# Patient Record
Sex: FEMALE | Race: WHITE | NOT HISPANIC OR LATINO | Employment: OTHER | URBAN - METROPOLITAN AREA
[De-identification: names, ages, dates, MRNs, and addresses within clinical notes are randomized per-mention and may not be internally consistent; named-entity substitution may affect disease eponyms.]

---

## 2017-01-11 ENCOUNTER — OFFICE VISIT (OUTPATIENT)
Dept: INTERNAL MEDICINE | Facility: CLINIC | Age: 42
End: 2017-01-11
Payer: MEDICAID

## 2017-01-11 VITALS
HEART RATE: 104 BPM | WEIGHT: 159.19 LBS | OXYGEN SATURATION: 96 % | SYSTOLIC BLOOD PRESSURE: 116 MMHG | HEIGHT: 69 IN | TEMPERATURE: 98 F | BODY MASS INDEX: 23.58 KG/M2 | DIASTOLIC BLOOD PRESSURE: 90 MMHG

## 2017-01-11 DIAGNOSIS — K04.7 DENTAL ABSCESS: Primary | ICD-10-CM

## 2017-01-11 PROCEDURE — 99999 PR PBB SHADOW E&M-EST. PATIENT-LVL IV: CPT | Mod: PBBFAC,,, | Performed by: PHYSICIAN ASSISTANT

## 2017-01-11 PROCEDURE — 99212 OFFICE O/P EST SF 10 MIN: CPT | Mod: S$PBB,,, | Performed by: PHYSICIAN ASSISTANT

## 2017-01-11 PROCEDURE — 99214 OFFICE O/P EST MOD 30 MIN: CPT | Mod: PBBFAC | Performed by: PHYSICIAN ASSISTANT

## 2017-01-11 RX ORDER — CLINDAMYCIN HYDROCHLORIDE 300 MG/1
300 CAPSULE ORAL 3 TIMES DAILY
Qty: 30 CAPSULE | Refills: 0 | Status: SHIPPED | OUTPATIENT
Start: 2017-01-11 | End: 2017-01-21

## 2017-01-11 RX ORDER — HYDROCODONE BITARTRATE AND ACETAMINOPHEN 10; 325 MG/1; MG/1
1 TABLET ORAL EVERY 12 HOURS PRN
Qty: 20 TABLET | Refills: 0 | Status: SHIPPED | OUTPATIENT
Start: 2017-01-11 | End: 2017-01-16

## 2017-01-11 NOTE — PROGRESS NOTES
Subjective:       Patient ID: Mariah Boggs is a 41 y.o. female.    Chief Complaint: Dental Pain and Headache    Dental Pain    This is a new problem. The current episode started 1 to 4 weeks ago. The problem occurs constantly. The problem has been gradually worsening. The pain is at a severity of 8/10. The pain is moderate. Associated symptoms include facial pain, a fever and thermal sensitivity. Pertinent negatives include no difficulty swallowing, oral bleeding or sinus pressure. She has tried ice (Made an appointment to see a dentist, but said it would be 2 weeks out. ) for the symptoms. The treatment provided no relief.     Review of Systems   Constitutional: Positive for chills and fever. Negative for fatigue.   HENT: Negative for congestion, sinus pressure, sore throat and trouble swallowing.        Objective:      Physical Exam   Constitutional: She appears well-developed and well-nourished. No distress.   HENT:   Mouth/Throat: Dental abscesses present.       Neck: Neck supple.   Lymphadenopathy:     She has no cervical adenopathy.   Skin: She is not diaphoretic.   Nursing note and vitals reviewed.      Assessment:       1. Dental abscess        Plan:       Dental abscess    Other orders  -     clindamycin (CLEOCIN) 300 MG capsule; Take 1 capsule (300 mg total) by mouth 3 (three) times daily.  Dispense: 30 capsule; Refill: 0  -     hydrocodone-acetaminophen 10-325mg (NORCO)  mg Tab; Take 1 tablet by mouth every 12 (twelve) hours as needed (For dental abscess).  Dispense: 20 tablet; Refill: 0

## 2017-01-11 NOTE — MR AVS SNAPSHOT
[facility]  [address]  [place] [address]  Phone: [phone]  Fax: [phone]                  Mariah Boggs   2017 10:20 AM   Office Visit    Description:  Female : 1975   Provider:  Andrzej Quick III, PA-C   Department:  [facility]           Reason for Visit     Dental Pain     Headache                To Do List           Future Appointments        Provider Department Dept Phone    3/28/2017 7:30 AM LABORATORY, [facility] [phone]    3/28/2017 8:00 AM Joi Ruiz PA-C [facility] Rheumatology [phone]      Goals (5 Years of Data)     None       These Medications        Disp Refills Start End    clindamycin (CLEOCIN) 300 MG capsule 30 capsule 0 2017    Take 1 capsule (300 mg total) by mouth 3 (three) times daily. - Oral    [facility]  [place] [address] Ph #: [phone]       hydrocodone-acetaminophen 10-325mg (NORCO)  mg Tab 20 tablet 0 2017    Take 1 tablet by mouth every 12 (twelve) hours as needed (For dental abscess). - Oral    [facility]  [place] - [address] Ph #: [phone]         [facility]     [facility] Care Line - / Assistance  Registered nurses in the [facility] provide clinical advisement, health education, appointment booking, and other advisory services.  Call for this free service at [phone].             Medications           Message regarding Medications     Verify the changes and/or additions to your medication regime listed below are the same as discussed with your clinician today.  If any of these changes or additions are incorrect, please notify your healthcare provider.        START taking these NEW medications        Refills    clindamycin (CLEOCIN) 300 MG capsule 0    Sig: Take 1 capsule (300 mg total) by mouth  3 (three) times daily.    Class: Normal    Route: Oral    hydrocodone-acetaminophen 10-325mg (NORCO)  mg Tab 0    Sig: Take 1 tablet by mouth every 12 (twelve) hours as needed (For dental abscess).    Class: Print    Route: Oral      STOP taking these medications     predniSONE (DELTASONE) 20 MG tablet take 2 tablets by mouth once daily for 5 days    hydrocodone-acetaminophen (HYCET) solution 7.5-325 mg/15mL Take 10 mLs by mouth every 6 (six) hours as needed for Pain.           Verify that the below list of medications is an accurate representation of the medications you are currently taking.  If none reported, the list may be blank. If incorrect, please contact your healthcare provider. Carry this list with you in case of emergency.           Current Medications     acyclovir (ZOVIRAX) 200 MG capsule Take one capsule by mouth three times daily    albuterol (VENTOLIN HFA) 90 mcg/actuation inhaler Inhale two puffs by mouth every four hours as needed  for wheezing    albuterol-ipratropium 2.5mg-0.5mg/3mL (DUO-NEB) 0.5 mg-3 mg(2.5 mg base)/3 mL nebulizer solution INHALE ONE VIAL VIA NEBULIZER EVERY SIX HOURS AS NEEDED FOR WHEEZING.     diclofenac-misoprostol  mg-mcg (ARTHROTEC 50)  mg-mcg per tablet Take 1 tablet by mouth 2 (two) times daily.    estradiol (ESTRACE) 2 MG tablet Take 1 tablet (2 mg total) by mouth once daily.    famotidine (PEPCID) 40 MG tablet     fluticasone (FLONASE ALLERGY RELIEF) 50 mcg/actuation nasal spray 2 sprays by Each Nare route once daily.    gabapentin (NEURONTIN) 600 MG tablet TAKE ONE TABLET BY MOUTH FOUR TIMES DAILY    hydroxychloroquine (PLAQUENIL) 200 mg tablet Take 1 tablet (200 mg total) by mouth once daily.    lidocaine-prilocaine (EMLA) cream Apply topically as needed.    LINZESS 290 mcg Cap TAKE ONE CAPSULE BY MOUTH ONE TIME DAILY    lubiprostone (AMITIZA) 8 MCG Cap Take 1 capsule (8 mcg total) by mouth 2 (two) times daily.    omeprazole (PRILOSEC) 20 MG capsule  "Take 1 capsule (20 mg total) by mouth once daily.    tizanidine (ZANAFLEX) 4 MG tablet Take 1-2 tablets (4-8 mg total) by mouth 3 (three) times daily as needed.    clindamycin (CLEOCIN) 300 MG capsule Take 1 capsule (300 mg total) by mouth 3 (three) times daily.    diphenhydrAMINE-aluminum-magnesium hydroxide-simethicone-lidocaine HCl 2% Swish and spit 5 mLs every 4 (four) hours as needed.    hydrocodone-acetaminophen 10-325mg (NORCO)  mg Tab Take 1 tablet by mouth every 12 (twelve) hours as needed (For dental abscess).           Clinical Reference Information           Vital Signs - Last Recorded  Most recent update: 1/11/2017 10:37 AM by Pura Carbajal MA    BP Pulse Temp Ht    (!) 116/90 (BP Location: Right arm, Patient Position: Sitting, BP Method: Manual) 104 97.6 °F (36.4 °C) (Tympanic) 5' 9" (1.753 m)    Wt LMP SpO2 BMI    72.2 kg (159 lb 2.8 oz) (LMP Unknown) 96% 23.51 kg/m2      Blood Pressure          Most Recent Value    BP  (!)  116/90      Allergies as of 1/11/2017     Metoclopramide    Phenergan [Promethazine]    Cortisone    Penicillins      Immunizations Administered on Date of Encounter - 1/11/2017     None      Instructions      Continue with antibiotics and new medicines until gone. May take tylenol PRN fever. Increase fluids and rest. Follow up with your dentist ASAP. Suggest togo to the Emergency Room if symptoms get much worse. Otherwise follow up with your PCP as scheduled.        Smoking Cessation     If you would like to quit smoking:   You may be eligible for free services if you are a [place] resident and started smoking cigarettes before September 1, 1988.  Call the Smoking Cessation Trust ([employer]) toll free at [phone] or [phone].   Call 4-800-QUIT-NOW if you do not meet the above criteria.            "

## 2017-01-11 NOTE — PATIENT INSTRUCTIONS
Continue with antibiotics and new medicines until gone. May take tylenol PRN fever. Increase fluids and rest. Follow up with your dentist ASAP. Suggest togo to the Emergency Room if symptoms get much worse. Otherwise follow up with your PCP as scheduled.

## 2017-02-14 ENCOUNTER — OFFICE VISIT (OUTPATIENT)
Dept: INTERNAL MEDICINE | Facility: CLINIC | Age: 42
End: 2017-02-14
Payer: MEDICAID

## 2017-02-14 VITALS
TEMPERATURE: 97 F | OXYGEN SATURATION: 98 % | DIASTOLIC BLOOD PRESSURE: 82 MMHG | HEART RATE: 80 BPM | HEIGHT: 69 IN | WEIGHT: 163.38 LBS | BODY MASS INDEX: 24.2 KG/M2 | SYSTOLIC BLOOD PRESSURE: 120 MMHG

## 2017-02-14 DIAGNOSIS — F33.1 MODERATE EPISODE OF RECURRENT MAJOR DEPRESSIVE DISORDER: ICD-10-CM

## 2017-02-14 DIAGNOSIS — F17.200 TOBACCO DEPENDENCE SYNDROME: ICD-10-CM

## 2017-02-14 DIAGNOSIS — K04.7 DENTAL ABSCESS: Primary | ICD-10-CM

## 2017-02-14 PROCEDURE — 99213 OFFICE O/P EST LOW 20 MIN: CPT | Mod: S$PBB,,, | Performed by: PHYSICIAN ASSISTANT

## 2017-02-14 PROCEDURE — 99215 OFFICE O/P EST HI 40 MIN: CPT | Mod: PBBFAC | Performed by: PHYSICIAN ASSISTANT

## 2017-02-14 PROCEDURE — 99999 PR PBB SHADOW E&M-EST. PATIENT-LVL V: CPT | Mod: PBBFAC,,, | Performed by: PHYSICIAN ASSISTANT

## 2017-02-14 RX ORDER — HYDROCODONE BITARTRATE AND ACETAMINOPHEN 7.5; 325 MG/1; MG/1
1 TABLET ORAL EVERY 6 HOURS PRN
Qty: 20 TABLET | Refills: 0 | Status: SHIPPED | OUTPATIENT
Start: 2017-02-14 | End: 2017-05-04

## 2017-02-14 RX ORDER — ESCITALOPRAM OXALATE 10 MG/1
10 TABLET ORAL DAILY
Qty: 30 TABLET | Refills: 5 | Status: SHIPPED | OUTPATIENT
Start: 2017-02-14 | End: 2017-07-21 | Stop reason: ALTCHOICE

## 2017-02-14 RX ORDER — CLINDAMYCIN HYDROCHLORIDE 300 MG/1
300 CAPSULE ORAL 3 TIMES DAILY
Qty: 30 CAPSULE | Refills: 0 | Status: SHIPPED | OUTPATIENT
Start: 2017-02-14 | End: 2017-02-24

## 2017-02-14 NOTE — PROGRESS NOTES
Subjective:       Patient ID: Mariah Boggs is a 41 y.o. female.    Chief Complaint: Dental Pain    Dental Pain    This is a new problem. The current episode started in the past 7 days. The problem occurs constantly. The problem has been unchanged. The pain is moderate. Associated symptoms include facial pain and thermal sensitivity. She has tried nothing (She has a dental appointment in 2 weeks. ) for the symptoms.   Mental Health Problem   The primary symptoms include dysphoric mood. The current episode started more than 1 month ago.   Precipitated by: chronic illness. The degree of incapacity that she is experiencing as a consequence of her illness is moderate. Additional symptoms of the illness include fatigue. Additional symptoms of the illness do not include no abdominal pain. She does not admit to suicidal ideas.     Review of Systems   Constitutional: Positive for fatigue. Negative for chills.   HENT: Negative.  Negative for facial swelling and mouth sores.    Respiratory: Negative for chest tightness and shortness of breath.    Cardiovascular: Negative for chest pain.   Gastrointestinal: Negative for abdominal pain.   Psychiatric/Behavioral: Positive for dysphoric mood. Negative for self-injury and suicidal ideas.       Objective:      Physical Exam   Constitutional: She appears well-developed and well-nourished. No distress.   HENT:   Head: Normocephalic and atraumatic.   Mouth/Throat: Oropharynx is clear and moist. Dental abscesses and dental caries present.       Cardiovascular: Normal rate and regular rhythm.  Exam reveals no gallop and no friction rub.    No murmur heard.  Pulmonary/Chest: Effort normal and breath sounds normal. No respiratory distress. She has no wheezes. She has no rales. She exhibits no tenderness.   Abdominal: Soft. There is no tenderness.   Lymphadenopathy:     She has no cervical adenopathy.   Skin: She is not diaphoretic.   Nursing note and vitals reviewed.      Assessment:        1. Dental abscess    2. Tobacco dependence syndrome    3. Moderate episode of recurrent major depressive disorder        Plan:       Dental abscess    Tobacco dependence syndrome  -     Ambulatory referral to Smoking Cessation Program    Moderate episode of recurrent major depressive disorder    Other orders  -     escitalopram oxalate (LEXAPRO) 10 MG tablet; Take 1 tablet (10 mg total) by mouth once daily.  Dispense: 30 tablet; Refill: 5  -     clindamycin (CLEOCIN) 300 MG capsule; Take 1 capsule (300 mg total) by mouth 3 (three) times daily.  Dispense: 30 capsule; Refill: 0  -     hydrocodone-acetaminophen 7.5-325mg (NORCO) 7.5-325 mg per tablet; Take 1 tablet by mouth every 6 (six) hours as needed for Pain (dental abscess pain).  Dispense: 20 tablet; Refill: 0

## 2017-02-14 NOTE — PATIENT INSTRUCTIONS
Continue with antibiotics and new medicines until gone. May take tylenol PRN fever. Increase fluids and rest. Follow up with your dentist in 2 weeks. Suggest togo to the Emergency Room if symptoms get much worse. Otherwise follow up with your PCP as scheduled.

## 2017-03-28 ENCOUNTER — OFFICE VISIT (OUTPATIENT)
Dept: RHEUMATOLOGY | Facility: CLINIC | Age: 42
End: 2017-03-28
Payer: MEDICAID

## 2017-03-28 ENCOUNTER — LAB VISIT (OUTPATIENT)
Dept: LAB | Facility: HOSPITAL | Age: 42
End: 2017-03-28
Attending: INTERNAL MEDICINE
Payer: MEDICAID

## 2017-03-28 VITALS
BODY MASS INDEX: 24.29 KG/M2 | HEART RATE: 95 BPM | SYSTOLIC BLOOD PRESSURE: 132 MMHG | DIASTOLIC BLOOD PRESSURE: 94 MMHG | WEIGHT: 164.44 LBS

## 2017-03-28 DIAGNOSIS — M79.7 FIBROMYALGIA: Primary | ICD-10-CM

## 2017-03-28 DIAGNOSIS — Z15.89 HLA B27 (HLA B27 POSITIVE): Chronic | ICD-10-CM

## 2017-03-28 DIAGNOSIS — M79.18 MYOFASCIAL PAIN: ICD-10-CM

## 2017-03-28 DIAGNOSIS — M25.50 MULTIPLE JOINT PAIN: ICD-10-CM

## 2017-03-28 DIAGNOSIS — I73.00 RAYNAUD'S PHENOMENON WITHOUT GANGRENE: ICD-10-CM

## 2017-03-28 DIAGNOSIS — M54.50 CHRONIC BILATERAL LOW BACK PAIN WITHOUT SCIATICA: ICD-10-CM

## 2017-03-28 DIAGNOSIS — G89.29 CHRONIC BILATERAL LOW BACK PAIN WITHOUT SCIATICA: ICD-10-CM

## 2017-03-28 DIAGNOSIS — I73.00 RAYNAUD PHENOMENON: ICD-10-CM

## 2017-03-28 DIAGNOSIS — M62.81 MUSCLE WEAKNESS (GENERALIZED): ICD-10-CM

## 2017-03-28 LAB
ALBUMIN SERPL BCP-MCNC: 4.1 G/DL
ALP SERPL-CCNC: 59 U/L
ALT SERPL W/O P-5'-P-CCNC: 8 U/L
ANION GAP SERPL CALC-SCNC: 11 MMOL/L
AST SERPL-CCNC: 13 U/L
BASOPHILS # BLD AUTO: 0.02 K/UL
BASOPHILS NFR BLD: 0.1 %
BILIRUB SERPL-MCNC: 0.4 MG/DL
BUN SERPL-MCNC: 11 MG/DL
CALCIUM SERPL-MCNC: 10.2 MG/DL
CHLORIDE SERPL-SCNC: 108 MMOL/L
CO2 SERPL-SCNC: 24 MMOL/L
CREAT SERPL-MCNC: 0.9 MG/DL
CRP SERPL-MCNC: 2.3 MG/L
DIFFERENTIAL METHOD: ABNORMAL
EOSINOPHIL # BLD AUTO: 0.1 K/UL
EOSINOPHIL NFR BLD: 0.5 %
ERYTHROCYTE [DISTWIDTH] IN BLOOD BY AUTOMATED COUNT: 13.3 %
ERYTHROCYTE [SEDIMENTATION RATE] IN BLOOD BY WESTERGREN METHOD: 8 MM/HR
EST. GFR  (AFRICAN AMERICAN): >60 ML/MIN/1.73 M^2
EST. GFR  (NON AFRICAN AMERICAN): >60 ML/MIN/1.73 M^2
GLUCOSE SERPL-MCNC: 114 MG/DL
HCT VFR BLD AUTO: 44 %
HGB BLD-MCNC: 14.6 G/DL
LYMPHOCYTES # BLD AUTO: 4 K/UL
LYMPHOCYTES NFR BLD: 26.2 %
MCH RBC QN AUTO: 30.3 PG
MCHC RBC AUTO-ENTMCNC: 33.2 %
MCV RBC AUTO: 91 FL
MONOCYTES # BLD AUTO: 1.3 K/UL
MONOCYTES NFR BLD: 8.6 %
NEUTROPHILS # BLD AUTO: 9.8 K/UL
NEUTROPHILS NFR BLD: 64.6 %
PLATELET # BLD AUTO: 269 K/UL
PMV BLD AUTO: 9.9 FL
POTASSIUM SERPL-SCNC: 4.1 MMOL/L
PROT SERPL-MCNC: 7.8 G/DL
RBC # BLD AUTO: 4.82 M/UL
SODIUM SERPL-SCNC: 143 MMOL/L
WBC # BLD AUTO: 15.18 K/UL

## 2017-03-28 PROCEDURE — 99214 OFFICE O/P EST MOD 30 MIN: CPT | Mod: PBBFAC,PO | Performed by: PHYSICIAN ASSISTANT

## 2017-03-28 PROCEDURE — 99999 PR PBB SHADOW E&M-EST. PATIENT-LVL IV: CPT | Mod: PBBFAC,,, | Performed by: PHYSICIAN ASSISTANT

## 2017-03-28 PROCEDURE — 99214 OFFICE O/P EST MOD 30 MIN: CPT | Mod: S$PBB,,, | Performed by: PHYSICIAN ASSISTANT

## 2017-03-28 RX ORDER — PREDNISONE 10 MG/1
10 TABLET ORAL DAILY
COMMUNITY
End: 2017-07-21

## 2017-03-28 RX ORDER — DICLOFENAC SODIUM AND MISOPROSTOL 50; 200 MG/1; UG/1
1 TABLET, DELAYED RELEASE ORAL 2 TIMES DAILY
Qty: 60 TABLET | Refills: 5 | Status: SHIPPED | OUTPATIENT
Start: 2017-03-28 | End: 2017-03-29 | Stop reason: SDUPTHER

## 2017-03-28 RX ORDER — LIDOCAINE 50 MG/G
1 PATCH TOPICAL DAILY
Qty: 30 PATCH | Refills: 3 | Status: SHIPPED | OUTPATIENT
Start: 2017-03-28 | End: 2017-03-29 | Stop reason: SDUPTHER

## 2017-03-28 RX ORDER — HYDROXYCHLOROQUINE SULFATE 200 MG/1
200 TABLET, FILM COATED ORAL DAILY
Qty: 30 TABLET | Refills: 5 | Status: SHIPPED | OUTPATIENT
Start: 2017-03-28 | End: 2017-03-29 | Stop reason: SDUPTHER

## 2017-03-28 NOTE — MR AVS SNAPSHOT
[facility]  [address] [place] [address]  Phone: [phone]  Fax: [phone]                  Mariah Boggs   3/28/2017 8:00 AM   Office Visit    Description:  Female : 1975   Provider:  Joi Ruiz PA-C   Department:  [facility] - Rheumatology           Reason for Visit     raynauds     Fibromyalgia           Diagnoses this Visit        Comments    Fibromyalgia    -  Primary     Muscle weakness (generalized)         Raynaud's phenomenon without gangrene         HLA B27 (HLA B27 positive)         Chronic bilateral low back pain without sciatica         Myofascial pain         Multiple joint pain                To Do List           Future Appointments        Provider Department Dept Phone    2017 7:30 AM [facility] XR1- [facility] [phone]    2017 9:30 AM LABORATORY, [address] [facility] [phone]    2017 9:40 AM SPECIMEN, [facility] [phone]    2017 8:00 AM Joi Ruiz PA-C [facility] Rheumatology [phone]    2017 7:30 AM SPECIMEN, [place] [facility] [phone]      Goals (5 Years of Data)     None      Follow-Up and Disposition     Return in about 4 months (around 2017) for xr lspine, xr si joints, nubia, dsdna, reg 4, urine, complements.  done week prior to visit. .       These Medications        Disp Refills Start End    diclofenac-misoprostol  mg-mcg (ARTHROTEC 50)  mg-mcg per tablet 60 tablet 5 3/28/2017     Take 1 tablet by mouth 2 (two) times daily. - Oral    [facility]: [facility] [facility] [place] - [address]  #: [phone]       hydroxychloroquine (PLAQUENIL) 200 mg tablet 30 tablet 5 3/28/2017     Take 1 tablet (200 mg total) by mouth once daily. - Oral    Pharmacy: [place] [address]  #: [phone]       lidocaine (LIDODERM) 5 % 30 patch 3 3/28/2017  3/28/2018    Place 1 patch onto the skin once daily. Remove & Discard patch within 12 hours or as directed by MD - Transdermal    Ph[facility]acy: [facility]  [place] - [address]  #: [phone]         [facility] On Call     [facility] On Call Nurse Care Line - 24/7 Assistance  Registered nurses in the [facility] provide clinical advisement, health education, appointment booking, and other advisory services.  Call for this free service at [phone].             Medications           Message regarding Medications     Verify the changes and/or additions to your medication regime listed below are the same as discussed with your clinician today.  If any of these changes or additions are incorrect, please notify your healthcare provider.        START taking these NEW medications        Refills    lidocaine (LIDODERM) 5 % 3    Sig: Place 1 patch onto the skin once daily. Remove & Discard patch within 12 hours or as directed by MD    Class: Normal    Route: Transdermal           Verify that the below list of medications is an accurate representation of the medications you are currently taking.  If none reported, the list may be blank. If incorrect, please contact your healthcare provider. Carry this list with you in case of emergency.           Current Medications     acyclovir (ZOVIRAX) 200 MG capsule Take one capsule by mouth three times daily    albuterol (VENTOLIN HFA) 90 mcg/actuation inhaler Inhale two puffs by mouth every four hours as needed  for wheezing    albuterol-ipratropium 2.5mg-0.5mg/3mL (DUO-NEB) 0.5 mg-3 mg(2.5 mg base)/3 mL nebulizer solution INHALE ONE VIAL VIA NEBULIZER EVERY SIX HOURS AS NEEDED FOR WHEEZING.     diclofenac-misoprostol  mg-mcg (ARTHROTEC 50)  mg-mcg per tablet Take 1 tablet by mouth 2 (two) times daily.    diphenhydrAMINE-aluminum-magnesium hydroxide-simethicone-lidocaine HCl 2% Swish and spit 5 mLs every 4 (four) hours as needed.     escitalopram oxalate (LEXAPRO) 10 MG tablet Take 1 tablet (10 mg total) by mouth once daily.    estradiol (ESTRACE) 2 MG tablet Take 1 tablet (2 mg total) by mouth once daily.    famotidine (PEPCID) 40 MG tablet     fluticasone (FLONASE ALLERGY RELIEF) 50 mcg/actuation nasal spray 2 sprays by Each Nare route once daily.    hydroxychloroquine (PLAQUENIL) 200 mg tablet Take 1 tablet (200 mg total) by mouth once daily.    lidocaine-prilocaine (EMLA) cream Apply topically as needed.    LINZESS 290 mcg Cap TAKE ONE CAPSULE BY MOUTH ONE TIME DAILY    lubiprostone (AMITIZA) 8 MCG Cap Take 1 capsule (8 mcg total) by mouth 2 (two) times daily.    omeprazole (PRILOSEC) 20 MG capsule Take 1 capsule (20 mg total) by mouth once daily.    predniSONE (DELTASONE) 10 MG tablet Take 10 mg by mouth once daily.    tizanidine (ZANAFLEX) 4 MG tablet Take 1-2 tablets (4-8 mg total) by mouth 3 (three) times daily as needed.    gabapentin (NEURONTIN) 600 MG tablet TAKE ONE TABLET BY MOUTH FOUR TIMES DAILY    hydrocodone-acetaminophen 7.5-325mg (NORCO) 7.5-325 mg per tablet Take 1 tablet by mouth every 6 (six) hours as needed for Pain (dental abscess pain).    lidocaine (LIDODERM) 5 % Place 1 patch onto the skin once daily. Remove & Discard patch within 12 hours or as directed by MD           Clinical Reference Information           Your Vitals Were     BP Pulse Weight Last Period BMI    132/94 95 74.6 kg (164 lb 7.4 oz) (LMP Unknown) 24.29 kg/m2      Blood Pressure          Most Recent Value    BP  (!)  132/94      Allergies as of 3/28/2017     Metoclopramide    Phenergan [Promethazine]    Cortisone      Immunizations Administered on Date of Encounter - 3/28/2017     None      Orders Placed During Today's Visit     Future Labs/Procedures Expected by Expires    SATNAM  3/28/2017 5/27/2018    Anti-DNA antibody, double-stranded  3/28/2017 3/28/2018    C3 complement  3/28/2017 3/28/2018    C4 complement  3/28/2017 3/28/2018    X-Ray Lumbar Spine  AP And Lateral  3/28/2017 3/28/2018    X-Ray Sacroiliac Joints 3 Views  3/28/2017 3/28/2018    Recurring Lab Work Interval Expires    C-reactive protein   3/28/2018    CBC auto differential   3/28/2018    Comprehensive metabolic panel   3/28/2018    Sedimentation rate, manual   3/28/2018    Urinalysis   5/27/2018      Instructions    No change in meds    Eye checks once yearly     Tumeric 1000mg/day (with pepper added)    Follow up with pcp       Smoking Cessation     If you would like to quit smoking:   You may be eligible for free services if you are a [place] resident and started smoking cigarettes before September 1, 1988.  Call the Smoking Cessation Trust ([facility]) toll free at [phone] or [phone].   Call -912-QUIT-NOW if you do not meet the above criteria.            Language Assistance Services     ATTENTION: Language assistance services are available, free of charge. Please call [phone].      ATENCIÓN: Si habla español, tiene a valladares disposición servicios gratuitos de asistencia lingüística. Llame al [phone].     CHÚ Ý: N?u b?n nói Ti?ng Vi?t, có các d?ch v? h? tr? ngôn ng? mi?n phí dành cho b?n. G?i s? [phone].         [facility] - Rheumatology complies with applicable Federal civil rights laws and does not discriminate on the basis of race, color, national origin, age, disability, or sex.
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
## 2017-03-28 NOTE — PATIENT INSTRUCTIONS
No change in meds    Eye checks once yearly     Tumeric 1000mg/day (with pepper added)    Follow up with pcp

## 2017-03-29 DIAGNOSIS — I73.00 RAYNAUD'S PHENOMENON WITHOUT GANGRENE: ICD-10-CM

## 2017-03-29 DIAGNOSIS — M54.50 CHRONIC BILATERAL LOW BACK PAIN WITHOUT SCIATICA: ICD-10-CM

## 2017-03-29 DIAGNOSIS — Z15.89 HLA B27 (HLA B27 POSITIVE): Chronic | ICD-10-CM

## 2017-03-29 DIAGNOSIS — G89.29 CHRONIC BILATERAL LOW BACK PAIN WITHOUT SCIATICA: ICD-10-CM

## 2017-03-29 DIAGNOSIS — M79.7 FIBROMYALGIA: ICD-10-CM

## 2017-03-29 RX ORDER — HYDROXYCHLOROQUINE SULFATE 200 MG/1
TABLET, FILM COATED ORAL
Qty: 30 TABLET | Refills: 4 | OUTPATIENT
Start: 2017-03-29

## 2017-03-29 RX ORDER — LIDOCAINE 50 MG/G
1 PATCH TOPICAL DAILY
Qty: 30 PATCH | Refills: 3 | Status: SHIPPED | OUTPATIENT
Start: 2017-03-29 | End: 2017-07-21 | Stop reason: ALTCHOICE

## 2017-03-29 RX ORDER — HYDROXYCHLOROQUINE SULFATE 200 MG/1
200 TABLET, FILM COATED ORAL DAILY
Qty: 30 TABLET | Refills: 5 | Status: SHIPPED | OUTPATIENT
Start: 2017-03-29 | End: 2017-10-23 | Stop reason: SDUPTHER

## 2017-03-29 RX ORDER — DICLOFENAC SODIUM AND MISOPROSTOL 50; 200 MG/1; UG/1
1 TABLET, DELAYED RELEASE ORAL 2 TIMES DAILY
Qty: 60 TABLET | Refills: 5 | Status: SHIPPED | OUTPATIENT
Start: 2017-03-29 | End: 2017-10-23 | Stop reason: SDUPTHER

## 2017-03-30 NOTE — TELEPHONE ENCOUNTER
----- Message from Fauzia Carreon sent at 3/30/2017 10:42 AM CDT -----  Contact: pt  Pt states would like Nurse Mojgan to know pharmacy faxed prescriptions on Monday and they were never filled,had visit on Tuesday and told sent but never received by the pharmacy ,pt needs her prescriptions filled very much ...[phone] (please notify when sent)         .  [facility] [facility] [address]  [address] [address]  Phone: [phone] Fax: [phone]

## 2017-04-21 ENCOUNTER — TELEPHONE (OUTPATIENT)
Dept: RHEUMATOLOGY | Facility: CLINIC | Age: 42
End: 2017-04-21

## 2017-04-24 ENCOUNTER — TELEPHONE (OUTPATIENT)
Dept: RHEUMATOLOGY | Facility: CLINIC | Age: 42
End: 2017-04-24

## 2017-04-25 DIAGNOSIS — M25.50 MULTIPLE JOINT PAIN: Primary | ICD-10-CM

## 2017-04-25 RX ORDER — DICLOFENAC SODIUM 10 MG/G
2 GEL TOPICAL 4 TIMES DAILY
Qty: 300 G | Refills: 3 | Status: SHIPPED | OUTPATIENT
Start: 2017-04-25 | End: 2018-11-09

## 2017-04-25 NOTE — TELEPHONE ENCOUNTER
Called pt to let her know insurance denied her lidoderm patches. Told her we can try to call in volteran gel. Pt verbalized understanding.

## 2017-05-04 ENCOUNTER — OFFICE VISIT (OUTPATIENT)
Dept: INTERNAL MEDICINE | Facility: CLINIC | Age: 42
End: 2017-05-04
Payer: MEDICAID

## 2017-05-04 VITALS
DIASTOLIC BLOOD PRESSURE: 88 MMHG | HEART RATE: 97 BPM | BODY MASS INDEX: 25.86 KG/M2 | HEIGHT: 69 IN | WEIGHT: 174.63 LBS | OXYGEN SATURATION: 98 % | TEMPERATURE: 98 F | SYSTOLIC BLOOD PRESSURE: 122 MMHG

## 2017-05-04 DIAGNOSIS — K04.7 ABSCESS, DENTAL: Primary | ICD-10-CM

## 2017-05-04 PROCEDURE — 99999 PR PBB SHADOW E&M-EST. PATIENT-LVL V: CPT | Mod: PBBFAC,,, | Performed by: PHYSICIAN ASSISTANT

## 2017-05-04 PROCEDURE — 99215 OFFICE O/P EST HI 40 MIN: CPT | Mod: PBBFAC | Performed by: PHYSICIAN ASSISTANT

## 2017-05-04 PROCEDURE — 99213 OFFICE O/P EST LOW 20 MIN: CPT | Mod: S$PBB,,, | Performed by: PHYSICIAN ASSISTANT

## 2017-05-04 RX ORDER — HYDROCODONE BITARTRATE AND ACETAMINOPHEN 10; 325 MG/1; MG/1
1 TABLET ORAL EVERY 12 HOURS PRN
Qty: 30 TABLET | Refills: 0 | Status: SHIPPED | OUTPATIENT
Start: 2017-05-04 | End: 2017-05-19

## 2017-05-04 RX ORDER — CLINDAMYCIN HYDROCHLORIDE 300 MG/1
300 CAPSULE ORAL 3 TIMES DAILY
Qty: 30 CAPSULE | Refills: 0 | Status: SHIPPED | OUTPATIENT
Start: 2017-05-04 | End: 2017-05-14

## 2017-05-04 NOTE — PROGRESS NOTES
Subjective:       Patient ID: Mariah Boggs is a 41 y.o. female.    Chief Complaint: Dental Pain and Fever    HPI Comments: Patient is a 40 yo female who was recently seen at the [facility] dental center for an on going right lower molar dental abscess and caries. She was set up to have a root canal done on may 21st. In the interim, she started developing new pain on the top left molar. She called the clinic for advise, so they told her to see me for treatment until they can see her on the 21st.     Dental Pain    This is a recurrent problem. The current episode started in the past 7 days. The problem occurs constantly. The problem has been unchanged. The pain is severe. Pertinent negatives include no fever. Associated symptoms comments: Can not chew with out pain. . She has tried nothing for the symptoms.     Review of Systems   Constitutional: Negative for chills, fatigue and fever.   HENT: Negative for facial swelling.        Objective:      Physical Exam   Constitutional: She appears well-developed and well-nourished. No distress.   HENT:   Mouth/Throat: Dental abscesses and dental caries present.       Cardiovascular: Normal rate and regular rhythm.    Pulmonary/Chest: Effort normal and breath sounds normal.   Lymphadenopathy:     She has no cervical adenopathy.   Skin: She is not diaphoretic.   Nursing note and vitals reviewed.      Assessment:       Dental abscess  Plan:       There are no diagnoses linked to this encounter.

## 2017-05-04 NOTE — MR AVS SNAPSHOT
[facility]  [address] [facility] [address]  Phone: [phone]  Fax: [phone]                  Mariah Boggs   2017 8:00 AM   Office Visit    Description:  Female : 1975   Provider:  Andrzej Quick III, PA-C   Department:  [facility] Internal Medicine           Reason for Visit     Dental Pain     Fever                To Do List           Future Appointments        Provider Department Dept Phone    2017 7:30 AM ONLH XR1- [facility] [phone]    2017 9:30 AM LABORATORY, [address] [facility] [phone]    2017 9:40 AM SPECIMEN, [address] [facility] [phone]    2017 8:00 AM Joi Ruiz PA-C [facility] Rheumatology [phone]    2017 7:30 AM SPECIMEN, [address] [facility] [phone]      Goals (5 Years of Data)     None       These Medications        Disp Refills Start End    clindamycin (CLEOCIN) 300 MG capsule 30 capsule 0 2017    Take 1 capsule (300 mg total) by mouth 3 (three) times daily. - Oral    Pharmacy: [facility] Ph #: [phone]       hydrocodone-acetaminophen 10-325mg (NORCO)  mg Tab 30 tablet 0 2017    Take 1 tablet by mouth every 12 (twelve) hours as needed (dental abscess pain). - Oral    Pharmacy: [facility] Ph #: [phone]         [facility]     [facility] Nurse Care Line - 24/ Assistance  Unless otherwise directed by your provider, please contact [facility], our nurse care line that is available for  assistance.     Registered nurses in the [facility] provide: appointment scheduling, clinical advisement, health education, and other advisory services.  Call: [phone] (toll free)               Medications           Message regarding Medications      Verify the changes and/or additions to your medication regime listed below are the same as discussed with your clinician today.  If any of these changes or additions are incorrect, please notify your healthcare provider.        START taking these NEW medications        Refills    clindamycin (CLEOCIN) 300 MG capsule 0    Sig: Take 1 capsule (300 mg total) by mouth 3 (three) times daily.    Class: Normal    Route: Oral    hydrocodone-acetaminophen 10-325mg (NORCO)  mg Tab 0    Sig: Take 1 tablet by mouth every 12 (twelve) hours as needed (dental abscess pain).    Class: Print    Route: Oral      STOP taking these medications     hydrocodone-acetaminophen 7.5-325mg (NORCO) 7.5-325 mg per tablet Take 1 tablet by mouth every 6 (six) hours as needed for Pain (dental abscess pain).           Verify that the below list of medications is an accurate representation of the medications you are currently taking.  If none reported, the list may be blank. If incorrect, please contact your healthcare provider. Carry this list with you in case of emergency.           Current Medications     albuterol (VENTOLIN HFA) 90 mcg/actuation inhaler Inhale two puffs by mouth every four hours as needed  for wheezing    albuterol-ipratropium 2.5mg-0.5mg/3mL (DUO-NEB) 0.5 mg-3 mg(2.5 mg base)/3 mL nebulizer solution INHALE ONE VIAL VIA NEBULIZER EVERY SIX HOURS AS NEEDED FOR WHEEZING.     diclofenac-misoprostol  mg-mcg (ARTHROTEC 50)  mg-mcg per tablet Take 1 tablet by mouth 2 (two) times daily.    estradiol (ESTRACE) 2 MG tablet Take 1 tablet (2 mg total) by mouth once daily.    famotidine (PEPCID) 40 MG tablet     fluticasone (FLONASE ALLERGY RELIEF) 50 mcg/actuation nasal spray 2 sprays by Each Nare route once daily.    gabapentin (NEURONTIN) 600 MG tablet TAKE ONE TABLET BY MOUTH FOUR TIMES DAILY    hydroxychloroquine (PLAQUENIL) 200 mg tablet Take 1 tablet (200 mg total) by mouth once daily.    omeprazole (PRILOSEC) 20  "MG capsule Take 1 capsule (20 mg total) by mouth once daily.    tizanidine (ZANAFLEX) 4 MG tablet Take 1-2 tablets (4-8 mg total) by mouth 3 (three) times daily as needed.    acyclovir (ZOVIRAX) 200 MG capsule Take one capsule by mouth three times daily    clindamycin (CLEOCIN) 300 MG capsule Take 1 capsule (300 mg total) by mouth 3 (three) times daily.    diclofenac sodium (VOLTAREN) 1 % Gel Apply 2 g topically 4 (four) times daily.    diphenhydrAMINE-aluminum-magnesium hydroxide-simethicone-lidocaine HCl 2% Swish and spit 5 mLs every 4 (four) hours as needed.    escitalopram oxalate (LEXAPRO) 10 MG tablet Take 1 tablet (10 mg total) by mouth once daily.    hydrocodone-acetaminophen 10-325mg (NORCO)  mg Tab Take 1 tablet by mouth every 12 (twelve) hours as needed (dental abscess pain).    lidocaine (LIDODERM) 5 % Place 1 patch onto the skin once daily. Remove & Discard patch within 12 hours or as directed by MD    lidocaine-prilocaine (EMLA) cream Apply topically as needed.    LINZESS 290 mcg Cap TAKE ONE CAPSULE BY MOUTH ONE TIME DAILY    lubiprostone (AMITIZA) 8 MCG Cap Take 1 capsule (8 mcg total) by mouth 2 (two) times daily.    predniSONE (DELTASONE) 10 MG tablet Take 10 mg by mouth once daily.           Clinical Reference Information           Your Vitals Were     BP Pulse Temp Height Weight Last Period    122/88 (BP Location: Right arm, Patient Position: Sitting, BP Method: Manual) 97 98 °F (36.7 °C) (Tympanic) 5' 9" (1.753 m) 79.2 kg (174 lb 9.7 oz) (LMP Unknown)    SpO2 BMI             98% 25.78 kg/m2         Blood Pressure          Most Recent Value    BP  122/88      Allergies as of 5/4/2017     Metoclopramide    Phenergan [Promethazine]    Cortisone      Immunizations Administered on Date of Encounter - 5/4/2017     None      Smoking Cessation     If you would like to quit smoking:   You may be eligible for free services if you are a [place] resident and started smoking cigarettes before " September 1, 1988.  Call the Smoking Cessation Trust (SCT) toll free at (290) 723-9516 or (367) 611-3185.   Call 1-800-QUIT-NOW if you do not meet the above criteria.   Contact us via email: [email or web address]   View our website for more information: [email or web address]        Language Assistance Services     ATTENTION: Language assistance services are available, free of charge. Please call [phone].      ATENCIÓN: Si genarola genesis, tiene a valladares disposición servicios gratuitos de asistencia lingüística. Llame al [phone].     CHÚ Ý: N?u b?n nói Ti?ng Vi?t, có các d?ch v? h? tr? ngôn ng? mi?n phí dành cho b?n. G?i s? [phone].         [facility] complies with applicable Federal civil rights laws and does not discriminate on the basis of race, color, national origin, age, disability, or sex.

## 2017-05-04 NOTE — PATIENT INSTRUCTIONS
Continue with antibiotics and new medicines until gone. Use narco sparingly.  Increase fluids and rest. Call the clinic if not better in 3 to 5 days. Suggest togo to the Emergency Room if symptoms get much worse. Otherwise follow up with your PCP as scheduled.

## 2017-06-23 ENCOUNTER — PATIENT OUTREACH (OUTPATIENT)
Dept: ADMINISTRATIVE | Facility: HOSPITAL | Age: 42
End: 2017-06-23

## 2017-06-28 ENCOUNTER — TELEPHONE (OUTPATIENT)
Dept: RHEUMATOLOGY | Facility: CLINIC | Age: 42
End: 2017-06-28

## 2017-06-28 DIAGNOSIS — G89.29 CHRONIC BILATERAL LOW BACK PAIN WITHOUT SCIATICA: Primary | ICD-10-CM

## 2017-06-28 DIAGNOSIS — M54.50 CHRONIC BILATERAL LOW BACK PAIN WITHOUT SCIATICA: Primary | ICD-10-CM

## 2017-06-28 DIAGNOSIS — M79.7 FIBROMYALGIA: ICD-10-CM

## 2017-06-28 RX ORDER — LIDOCAINE AND PRILOCAINE 25; 25 MG/G; MG/G
CREAM TOPICAL
Qty: 30 G | Refills: 3 | Status: SHIPPED | OUTPATIENT
Start: 2017-06-28 | End: 2017-08-30

## 2017-06-28 NOTE — TELEPHONE ENCOUNTER
Called Ruchi at [facility] to clarify the directions for Lidocaine cream. Gave clarifications on RX, Pharmacy verbalized understanding.

## 2017-06-28 NOTE — TELEPHONE ENCOUNTER
----- Message from Brent Huston sent at 6/28/2017  1:25 PM CDT -----  Contact: [facility]  She's calling in regards to clarification on the RX medication faxed on this pt please advise, [phone] opt 3

## 2017-07-17 DIAGNOSIS — Z78.0 MENOPAUSE: ICD-10-CM

## 2017-07-18 RX ORDER — ESTRADIOL 2 MG/1
TABLET ORAL
Qty: 30 TABLET | Refills: 11 | OUTPATIENT
Start: 2017-07-18

## 2017-07-18 RX ORDER — ESTRADIOL 2 MG/1
2 TABLET ORAL DAILY
Qty: 30 TABLET | Refills: 0 | OUTPATIENT
Start: 2017-07-18

## 2017-07-18 NOTE — TELEPHONE ENCOUNTER
----- Message from Annabel Mortensen sent at 7/18/2017 11:12 AM CDT -----  Contact: Patient   Patient needs a refill to cover her until next appointment on 7/21/17, Refill for Estradiol 2mg, Please call her at [phone].      [facility] [facility] [address]  [address] [address]  Phone: [phone] Fax: [phone]    Thanks  td

## 2017-07-21 ENCOUNTER — OFFICE VISIT (OUTPATIENT)
Dept: OBSTETRICS AND GYNECOLOGY | Facility: CLINIC | Age: 42
End: 2017-07-21
Payer: MEDICAID

## 2017-07-21 VITALS
HEIGHT: 69 IN | BODY MASS INDEX: 26.74 KG/M2 | DIASTOLIC BLOOD PRESSURE: 90 MMHG | WEIGHT: 180.56 LBS | SYSTOLIC BLOOD PRESSURE: 110 MMHG

## 2017-07-21 DIAGNOSIS — Z78.0 MENOPAUSE: ICD-10-CM

## 2017-07-21 PROCEDURE — 99213 OFFICE O/P EST LOW 20 MIN: CPT | Mod: S$PBB,,, | Performed by: NURSE PRACTITIONER

## 2017-07-21 PROCEDURE — 99213 OFFICE O/P EST LOW 20 MIN: CPT | Mod: PBBFAC | Performed by: NURSE PRACTITIONER

## 2017-07-21 PROCEDURE — 99999 PR PBB SHADOW E&M-EST. PATIENT-LVL III: CPT | Mod: PBBFAC,,, | Performed by: NURSE PRACTITIONER

## 2017-07-21 RX ORDER — ESTRADIOL 2 MG/1
2 TABLET ORAL DAILY
Qty: 30 TABLET | Refills: 11 | Status: SHIPPED | OUTPATIENT
Start: 2017-07-21 | End: 2017-07-21 | Stop reason: SDUPTHER

## 2017-07-21 RX ORDER — PROMETHAZINE HYDROCHLORIDE 6.25 MG/5ML
SYRUP ORAL
COMMUNITY
Start: 2017-07-20 | End: 2017-08-09

## 2017-07-21 RX ORDER — TOPIRAMATE 100 MG/1
200 TABLET, FILM COATED ORAL EVERY 12 HOURS PRN
Refills: 0 | COMMUNITY
Start: 2017-07-10 | End: 2018-06-01 | Stop reason: SDUPTHER

## 2017-07-21 RX ORDER — ESTRADIOL 2 MG/1
2 TABLET ORAL DAILY
Qty: 30 TABLET | Refills: 11 | Status: SHIPPED | OUTPATIENT
Start: 2017-07-21 | End: 2018-07-12 | Stop reason: SDUPTHER

## 2017-07-21 NOTE — PROGRESS NOTES
CC: Well woman exam    Mariah Boggs is a 42 y.o. female  presents for well woman exam.Patient is s/p for hysterectomy secondary to cervical cancer. Last pap exam was normal, . Patient is currently on Estrace 2 mg po and is presenting for a refill on meds.     Past Medical History:   Diagnosis Date    Abnormal Pap smear of cervix     ADHD (attention deficit hyperactivity disorder)     Allergy     Anemia     Anxiety     Arthritis     Cervical cancer     hysterectomy     Chronic kidney disease     Depression     Wander Barr virus infection     at 11 years old    Fatigue     chronic fatigue syndrome    Heart murmur     Hydronephrosis of right kidney     Lung disease     Lupus     Mixed restrictive and obstructive lung disease 2014    Nephrolithiasis     Pneumonia     PONV (postoperative nausea and vomiting)     PTSD (post-traumatic stress disorder)     Rash     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)    Tremor 2014     Past Surgical History:   Procedure Laterality Date    ADENOIDECTOMY      CERVICAL BIOPSY  W/ LOOP ELECTRODE EXCISION      CLAVICLE SURGERY Left     x2    COSMETIC SURGERY      HYSTERECTOMY      A2    SHOULDER SURGERY Left     TONSILLECTOMY       Social History     Social History    Marital status:      Spouse name: N/A    Number of children: N/A    Years of education: N/A     Occupational History    special education        on diability     Social History Main Topics    Smoking status: Current Every Day Smoker     Packs/day: 0.50     Years: 25.00    Smokeless tobacco: Current User      Comment: trying to quit    Alcohol use No    Drug use: No    Sexual activity: Yes     Partners: Male     Birth control/ protection: See Surgical Hx     Other Topics Concern    Not on file     Social History Narrative    Living with best friend     Family History   Problem Relation Age of Onset    Fibromyalgia  "Mother     Lupus Mother     Mental illness Mother     Asthma Mother     Hypertension Mother     Cancer Maternal Aunt     Asthma Father     Cancer Maternal Grandmother     Emphysema Maternal Grandmother     Diabetes Paternal Grandfather     Cancer Maternal Grandfather      OB History      Para Term  AB Living    5 3 3   2 3    SAB TAB Ectopic Multiple Live Births    2       3          BP (!) 110/90   Ht 5' 9" (1.753 m)   Wt 81.9 kg (180 lb 8.9 oz)   LMP  (LMP Unknown)   BMI 26.66 kg/m²       ROS:  GENERAL: Denies weight gain or weight loss. Feeling well overall.   SKIN: Denies rash or lesions.   HEAD: Denies head injury or headache.   NODES: Denies enlarged lymph nodes.   CHEST: Denies chest pain or shortness of breath.   CARDIOVASCULAR: Denies palpitations or left sided chest pain.   ABDOMEN: No abdominal pain, constipation, diarrhea, nausea, vomiting or rectal bleeding.   URINARY: No frequency, dysuria, hematuria, or burning on urination.  REPRODUCTIVE: See HPI.   BREASTS: The patient performs breast self-examination and denies pain, lumps, or nipple discharge.   HEMATOLOGIC: No easy bruisability or excessive bleeding.   MUSCULOSKELETAL: Denies joint pain or swelling.   NEUROLOGIC: Denies syncope or weakness.   PSYCHIATRIC: Denies depression, anxiety or mood swings.    PHYSICAL EXAM:  APPEARANCE: Obese female, in no acute distress.  AFFECT: WNL, alert and oriented x 3  SKIN: No acne or hirsutism  NECK: Neck symmetric without masses or thyromegaly  NODES: No inguinal, cervical, axillary, or femoral lymph node enlargement  CHEST: Good respiratory effect  ABDOMEN: Soft.  No tenderness or masses.    BREASTS: Refused    1. Menopause  estradiol (ESTRACE) 2 MG tablet    DISCONTINUED: estradiol (ESTRACE) 2 MG tablet     PLAN:  Patient refusing mammogram at this time; will consider in 6 months  Refill on meds              "

## 2017-07-28 DIAGNOSIS — M79.7 FIBROMYALGIA: ICD-10-CM

## 2017-07-28 RX ORDER — GABAPENTIN 600 MG/1
TABLET ORAL
Qty: 120 TABLET | Refills: 11 | Status: SHIPPED | OUTPATIENT
Start: 2017-07-28 | End: 2018-08-22 | Stop reason: SDUPTHER

## 2017-08-09 ENCOUNTER — SURGERY (OUTPATIENT)
Age: 42
End: 2017-08-09

## 2017-08-09 ENCOUNTER — ANESTHESIA EVENT (OUTPATIENT)
Dept: SURGERY | Facility: HOSPITAL | Age: 42
End: 2017-08-09
Payer: MEDICAID

## 2017-08-09 ENCOUNTER — HOSPITAL ENCOUNTER (OUTPATIENT)
Facility: HOSPITAL | Age: 42
Discharge: HOME | End: 2017-08-09
Attending: SPECIALIST | Admitting: SURGERY
Payer: MEDICAID

## 2017-08-09 ENCOUNTER — ANESTHESIA (OUTPATIENT)
Dept: SURGERY | Facility: HOSPITAL | Age: 42
End: 2017-08-09
Payer: MEDICAID

## 2017-08-09 VITALS
HEIGHT: 69 IN | BODY MASS INDEX: 26.66 KG/M2 | RESPIRATION RATE: 16 BRPM | DIASTOLIC BLOOD PRESSURE: 98 MMHG | TEMPERATURE: 98 F | HEART RATE: 95 BPM | WEIGHT: 180 LBS | OXYGEN SATURATION: 100 % | SYSTOLIC BLOOD PRESSURE: 151 MMHG

## 2017-08-09 DIAGNOSIS — K60.2 ANAL FISSURE: ICD-10-CM

## 2017-08-09 DIAGNOSIS — K64.9 ACUTE HEMORRHOID: ICD-10-CM

## 2017-08-09 DIAGNOSIS — K64.5 EXTERNAL HEMORRHOID, THROMBOSED: Primary | ICD-10-CM

## 2017-08-09 LAB
ALBUMIN SERPL BCP-MCNC: 3.7 G/DL
ALP SERPL-CCNC: 89 U/L
ALT SERPL W/O P-5'-P-CCNC: 49 U/L
ANION GAP SERPL CALC-SCNC: 11 MMOL/L
APTT BLDCRRT: 25.4 SEC
AST SERPL-CCNC: 17 U/L
BASOPHILS # BLD AUTO: 0.05 K/UL
BASOPHILS NFR BLD: 0.6 %
BILIRUB SERPL-MCNC: 0.3 MG/DL
BUN SERPL-MCNC: 12 MG/DL
CALCIUM SERPL-MCNC: 9.6 MG/DL
CHLORIDE SERPL-SCNC: 108 MMOL/L
CO2 SERPL-SCNC: 20 MMOL/L
CREAT SERPL-MCNC: 1 MG/DL
DIFFERENTIAL METHOD: ABNORMAL
EOSINOPHIL # BLD AUTO: 0.2 K/UL
EOSINOPHIL NFR BLD: 3.1 %
ERYTHROCYTE [DISTWIDTH] IN BLOOD BY AUTOMATED COUNT: 13.5 %
EST. GFR  (AFRICAN AMERICAN): >60 ML/MIN/1.73 M^2
EST. GFR  (NON AFRICAN AMERICAN): >60 ML/MIN/1.73 M^2
GLUCOSE SERPL-MCNC: 107 MG/DL
HCT VFR BLD AUTO: 39.5 %
HGB BLD-MCNC: 13.6 G/DL
INR PPP: 0.9
LYMPHOCYTES # BLD AUTO: 3.2 K/UL
LYMPHOCYTES NFR BLD: 41.8 %
MAGNESIUM SERPL-MCNC: 2.4 MG/DL
MCH RBC QN AUTO: 30.3 PG
MCHC RBC AUTO-ENTMCNC: 34.4 G/DL
MCV RBC AUTO: 88 FL
MONOCYTES # BLD AUTO: 1 K/UL
MONOCYTES NFR BLD: 12.9 %
NEUTROPHILS # BLD AUTO: 3.2 K/UL
NEUTROPHILS NFR BLD: 41.6 %
PLATELET # BLD AUTO: 300 K/UL
PMV BLD AUTO: 8.9 FL
POTASSIUM SERPL-SCNC: 4 MMOL/L
PROT SERPL-MCNC: 7.8 G/DL
PROTHROMBIN TIME: 9.7 SEC
RBC # BLD AUTO: 4.49 M/UL
SODIUM SERPL-SCNC: 139 MMOL/L
WBC # BLD AUTO: 7.73 K/UL

## 2017-08-09 PROCEDURE — G0378 HOSPITAL OBSERVATION PER HR: HCPCS

## 2017-08-09 PROCEDURE — 25000003 PHARM REV CODE 250: Performed by: NURSE ANESTHETIST, CERTIFIED REGISTERED

## 2017-08-09 PROCEDURE — 63600175 PHARM REV CODE 636 W HCPCS: Performed by: NURSE ANESTHETIST, CERTIFIED REGISTERED

## 2017-08-09 PROCEDURE — 96375 TX/PRO/DX INJ NEW DRUG ADDON: CPT

## 2017-08-09 PROCEDURE — 36000706: Performed by: SURGERY

## 2017-08-09 PROCEDURE — 96374 THER/PROPH/DIAG INJ IV PUSH: CPT

## 2017-08-09 PROCEDURE — 88304 TISSUE EXAM BY PATHOLOGIST: CPT | Performed by: PATHOLOGY

## 2017-08-09 PROCEDURE — 99219 PR INITIAL OBSERVATION CARE,LEVL II: CPT | Mod: 57,,, | Performed by: PHYSICIAN ASSISTANT

## 2017-08-09 PROCEDURE — 25000003 PHARM REV CODE 250: Performed by: SURGERY

## 2017-08-09 PROCEDURE — 99284 EMERGENCY DEPT VISIT MOD MDM: CPT

## 2017-08-09 PROCEDURE — 85610 PROTHROMBIN TIME: CPT

## 2017-08-09 PROCEDURE — 63600175 PHARM REV CODE 636 W HCPCS: Performed by: SPECIALIST

## 2017-08-09 PROCEDURE — 36000707: Performed by: SURGERY

## 2017-08-09 PROCEDURE — 88304 TISSUE EXAM BY PATHOLOGIST: CPT | Mod: 26,,, | Performed by: PATHOLOGY

## 2017-08-09 PROCEDURE — 85730 THROMBOPLASTIN TIME PARTIAL: CPT

## 2017-08-09 PROCEDURE — 80053 COMPREHEN METABOLIC PANEL: CPT

## 2017-08-09 PROCEDURE — 71000033 HC RECOVERY, INTIAL HOUR: Performed by: SURGERY

## 2017-08-09 PROCEDURE — 71000015 HC POSTOP RECOV 1ST HR: Performed by: SURGERY

## 2017-08-09 PROCEDURE — 27201423 OPTIME MED/SURG SUP & DEVICES STERILE SUPPLY: Performed by: SURGERY

## 2017-08-09 PROCEDURE — 71000039 HC RECOVERY, EACH ADD'L HOUR: Performed by: SURGERY

## 2017-08-09 PROCEDURE — 46320 REMOVAL OF HEMORRHOID CLOT: CPT | Mod: ,,, | Performed by: SURGERY

## 2017-08-09 PROCEDURE — 83735 ASSAY OF MAGNESIUM: CPT

## 2017-08-09 PROCEDURE — 37000008 HC ANESTHESIA 1ST 15 MINUTES: Performed by: SURGERY

## 2017-08-09 PROCEDURE — 37000009 HC ANESTHESIA EA ADD 15 MINS: Performed by: SURGERY

## 2017-08-09 PROCEDURE — 85025 COMPLETE CBC W/AUTO DIFF WBC: CPT

## 2017-08-09 PROCEDURE — 63600175 PHARM REV CODE 636 W HCPCS: Performed by: ANESTHESIOLOGY

## 2017-08-09 RX ORDER — SUCCINYLCHOLINE CHLORIDE 20 MG/ML
INJECTION INTRAMUSCULAR; INTRAVENOUS
Status: DISCONTINUED | OUTPATIENT
Start: 2017-08-09 | End: 2017-08-09

## 2017-08-09 RX ORDER — MEPERIDINE HYDROCHLORIDE 50 MG/1
50 TABLET ORAL EVERY 4 HOURS PRN
Qty: 30 TABLET | Refills: 0 | Status: SHIPPED | OUTPATIENT
Start: 2017-08-09 | End: 2017-08-30

## 2017-08-09 RX ORDER — OXYCODONE AND ACETAMINOPHEN 10; 325 MG/1; MG/1
1 TABLET ORAL EVERY 4 HOURS PRN
Qty: 30 TABLET | Refills: 0 | Status: SHIPPED | OUTPATIENT
Start: 2017-08-09 | End: 2017-08-11

## 2017-08-09 RX ORDER — MIDAZOLAM HYDROCHLORIDE 1 MG/ML
INJECTION, SOLUTION INTRAMUSCULAR; INTRAVENOUS
Status: DISCONTINUED | OUTPATIENT
Start: 2017-08-09 | End: 2017-08-09

## 2017-08-09 RX ORDER — ONDANSETRON 2 MG/ML
4 INJECTION INTRAMUSCULAR; INTRAVENOUS DAILY PRN
Status: DISCONTINUED | OUTPATIENT
Start: 2017-08-09 | End: 2017-08-09 | Stop reason: HOSPADM

## 2017-08-09 RX ORDER — FENTANYL CITRATE 50 UG/ML
50 INJECTION, SOLUTION INTRAMUSCULAR; INTRAVENOUS
Status: COMPLETED | OUTPATIENT
Start: 2017-08-09 | End: 2017-08-09

## 2017-08-09 RX ORDER — ACETAMINOPHEN 10 MG/ML
1000 INJECTION, SOLUTION INTRAVENOUS ONCE
Status: COMPLETED | OUTPATIENT
Start: 2017-08-09 | End: 2017-08-09

## 2017-08-09 RX ORDER — HYDROCODONE BITARTRATE AND ACETAMINOPHEN 5; 325 MG/1; MG/1
1 TABLET ORAL EVERY 4 HOURS PRN
Status: DISCONTINUED | OUTPATIENT
Start: 2017-08-09 | End: 2017-08-09 | Stop reason: HOSPADM

## 2017-08-09 RX ORDER — ONDANSETRON 2 MG/ML
INJECTION INTRAMUSCULAR; INTRAVENOUS
Status: DISCONTINUED | OUTPATIENT
Start: 2017-08-09 | End: 2017-08-09

## 2017-08-09 RX ORDER — SODIUM CHLORIDE 0.9 % (FLUSH) 0.9 %
3 SYRINGE (ML) INJECTION
Status: DISCONTINUED | OUTPATIENT
Start: 2017-08-09 | End: 2017-08-09 | Stop reason: HOSPADM

## 2017-08-09 RX ORDER — SODIUM CHLORIDE 9 MG/ML
INJECTION, SOLUTION INTRAVENOUS CONTINUOUS
Status: DISCONTINUED | OUTPATIENT
Start: 2017-08-09 | End: 2017-08-09 | Stop reason: HOSPADM

## 2017-08-09 RX ORDER — FENTANYL CITRATE 50 UG/ML
INJECTION, SOLUTION INTRAMUSCULAR; INTRAVENOUS
Status: DISCONTINUED | OUTPATIENT
Start: 2017-08-09 | End: 2017-08-09

## 2017-08-09 RX ORDER — SODIUM CHLORIDE, SODIUM LACTATE, POTASSIUM CHLORIDE, CALCIUM CHLORIDE 600; 310; 30; 20 MG/100ML; MG/100ML; MG/100ML; MG/100ML
INJECTION, SOLUTION INTRAVENOUS CONTINUOUS PRN
Status: DISCONTINUED | OUTPATIENT
Start: 2017-08-09 | End: 2017-08-09

## 2017-08-09 RX ORDER — SENNOSIDES 25 MG/1
1 TABLET, FILM COATED ORAL 3 TIMES DAILY PRN
Qty: 45 G | Refills: 0 | Status: SHIPPED | OUTPATIENT
Start: 2017-08-09 | End: 2017-08-30

## 2017-08-09 RX ORDER — DOCUSATE SODIUM 100 MG/1
100 CAPSULE, LIQUID FILLED ORAL 2 TIMES DAILY PRN
Qty: 60 CAPSULE | Refills: 0 | Status: SHIPPED | OUTPATIENT
Start: 2017-08-09 | End: 2017-08-30

## 2017-08-09 RX ORDER — LIDOCAINE HYDROCHLORIDE 20 MG/ML
INJECTION, SOLUTION EPIDURAL; INFILTRATION; INTRACAUDAL; PERINEURAL
Status: DISCONTINUED | OUTPATIENT
Start: 2017-08-09 | End: 2017-08-09

## 2017-08-09 RX ORDER — MORPHINE SULFATE 10 MG/ML
2 INJECTION INTRAMUSCULAR; INTRAVENOUS; SUBCUTANEOUS EVERY 5 MIN PRN
Status: COMPLETED | OUTPATIENT
Start: 2017-08-09 | End: 2017-08-09

## 2017-08-09 RX ORDER — ROCURONIUM BROMIDE 10 MG/ML
INJECTION, SOLUTION INTRAVENOUS
Status: DISCONTINUED | OUTPATIENT
Start: 2017-08-09 | End: 2017-08-09

## 2017-08-09 RX ORDER — HYDROCODONE BITARTRATE AND ACETAMINOPHEN 10; 325 MG/1; MG/1
1 TABLET ORAL EVERY 4 HOURS PRN
Qty: 20 TABLET | Refills: 0 | Status: SHIPPED | OUTPATIENT
Start: 2017-08-09 | End: 2017-08-09 | Stop reason: HOSPADM

## 2017-08-09 RX ORDER — PROMETHAZINE HYDROCHLORIDE 6.25 MG/5ML
25 SYRUP ORAL EVERY 6 HOURS PRN
Status: ON HOLD | COMMUNITY
End: 2017-08-09 | Stop reason: HOSPADM

## 2017-08-09 RX ORDER — PROPOFOL 10 MG/ML
VIAL (ML) INTRAVENOUS
Status: DISCONTINUED | OUTPATIENT
Start: 2017-08-09 | End: 2017-08-09

## 2017-08-09 RX ORDER — ONDANSETRON 2 MG/ML
8 INJECTION INTRAMUSCULAR; INTRAVENOUS
Status: COMPLETED | OUTPATIENT
Start: 2017-08-09 | End: 2017-08-09

## 2017-08-09 RX ORDER — HYDROCODONE BITARTRATE AND ACETAMINOPHEN 10; 325 MG/1; MG/1
1 TABLET ORAL EVERY 4 HOURS PRN
Qty: 30 TABLET | Refills: 0 | Status: SHIPPED | OUTPATIENT
Start: 2017-08-09 | End: 2017-08-30

## 2017-08-09 RX ORDER — HYDROMORPHONE HYDROCHLORIDE 2 MG/ML
0.2 INJECTION, SOLUTION INTRAMUSCULAR; INTRAVENOUS; SUBCUTANEOUS EVERY 5 MIN PRN
Status: COMPLETED | OUTPATIENT
Start: 2017-08-09 | End: 2017-08-09

## 2017-08-09 RX ORDER — OXYCODONE AND ACETAMINOPHEN 10; 325 MG/1; MG/1
1 TABLET ORAL EVERY 4 HOURS PRN
Qty: 30 TABLET | Refills: 0 | Status: SHIPPED | OUTPATIENT
Start: 2017-08-09 | End: 2017-08-30

## 2017-08-09 RX ADMIN — HYDROMORPHONE HYDROCHLORIDE 0.2 MG: 2 INJECTION, SOLUTION INTRAMUSCULAR; INTRAVENOUS; SUBCUTANEOUS at 12:08

## 2017-08-09 RX ADMIN — ONDANSETRON 8 MG: 2 INJECTION INTRAMUSCULAR; INTRAVENOUS at 07:08

## 2017-08-09 RX ADMIN — ROCURONIUM BROMIDE 5 MG: 10 INJECTION, SOLUTION INTRAVENOUS at 11:08

## 2017-08-09 RX ADMIN — PROPOFOL 50 MG: 10 INJECTION, EMULSION INTRAVENOUS at 11:08

## 2017-08-09 RX ADMIN — HYDROMORPHONE HYDROCHLORIDE 0.2 MG: 2 INJECTION, SOLUTION INTRAMUSCULAR; INTRAVENOUS; SUBCUTANEOUS at 01:08

## 2017-08-09 RX ADMIN — FENTANYL CITRATE 50 MCG: 50 INJECTION, SOLUTION INTRAMUSCULAR; INTRAVENOUS at 11:08

## 2017-08-09 RX ADMIN — PROPOFOL 150 MG: 10 INJECTION, EMULSION INTRAVENOUS at 11:08

## 2017-08-09 RX ADMIN — FENTANYL CITRATE 50 MCG: 50 INJECTION INTRAMUSCULAR; INTRAVENOUS at 07:08

## 2017-08-09 RX ADMIN — ACETAMINOPHEN 1000 MG: 10 INJECTION, SOLUTION INTRAVENOUS at 01:08

## 2017-08-09 RX ADMIN — MORPHINE SULFATE 2 MG: 10 INJECTION, SOLUTION INTRAMUSCULAR; INTRAVENOUS at 12:08

## 2017-08-09 RX ADMIN — SUCCINYLCHOLINE CHLORIDE 120 MG: 20 INJECTION, SOLUTION INTRAMUSCULAR; INTRAVENOUS at 11:08

## 2017-08-09 RX ADMIN — ONDANSETRON 4 MG: 2 INJECTION, SOLUTION INTRAMUSCULAR; INTRAVENOUS at 12:08

## 2017-08-09 RX ADMIN — HYDROMORPHONE HYDROCHLORIDE 0.2 MG: 2 INJECTION, SOLUTION INTRAMUSCULAR; INTRAVENOUS; SUBCUTANEOUS at 02:08

## 2017-08-09 RX ADMIN — LIDOCAINE HYDROCHLORIDE 40 MG: 20 INJECTION, SOLUTION EPIDURAL; INFILTRATION; INTRACAUDAL; PERINEURAL at 11:08

## 2017-08-09 RX ADMIN — FENTANYL CITRATE 100 MCG: 50 INJECTION, SOLUTION INTRAMUSCULAR; INTRAVENOUS at 11:08

## 2017-08-09 RX ADMIN — MIDAZOLAM HYDROCHLORIDE 2 MG: 1 INJECTION, SOLUTION INTRAMUSCULAR; INTRAVENOUS at 11:08

## 2017-08-09 RX ADMIN — SODIUM CHLORIDE, SODIUM LACTATE, POTASSIUM CHLORIDE, AND CALCIUM CHLORIDE: 600; 310; 30; 20 INJECTION, SOLUTION INTRAVENOUS at 11:08

## 2017-08-09 RX ADMIN — Medication 1 EACH: at 12:08

## 2017-08-09 RX ADMIN — MORPHINE SULFATE 2 MG: 10 INJECTION, SOLUTION INTRAMUSCULAR; INTRAVENOUS at 01:08

## 2017-08-09 NOTE — ED NOTES
Pt lying in bed. Aaox3. evie breath sounds clear. resp e/u. Pt c.o rectal pain 8/10. Bed low and locked. Side rails up. Family at bedside

## 2017-08-09 NOTE — ANESTHESIA PREPROCEDURE EVALUATION
08/09/2017  Mariah Boggs is a 42 y.o., female.    Anesthesia Evaluation    I have reviewed the Patient Summary Reports.        Review of Systems  Anesthesia Hx:  Hx of Anesthetic complications  Denies Family Hx of Anesthesia complications.   Denies Personal Hx of Anesthesia complications.   Cardiovascular:   Denies Hypertension.  Denies MI.      Pulmonary:   COPD Lupus related pulmonary disease   Renal/:   Chronic Renal Disease    Neurological:   Headaches Seizures    Dermatological:   SLE   Psych:   Psychiatric History          Physical Exam  General:  Well nourished    Airway/Jaw/Neck:  Airway Findings: Mouth Opening: Normal General Airway Assessment: Adult  Mallampati: II       Chest/Lungs:  Chest/Lungs Findings: Clear to auscultation     Heart/Vascular:  Heart Findings: Rate: Normal  Sounds: Normal             Anesthesia Plan  Type of Anesthesia, risks & benefits discussed:  Anesthesia Type:  general  Patient's Preference:   Intra-op Monitoring Plan:   Intra-op Monitoring Plan Comments:   Post Op Pain Control Plan:   Post Op Pain Control Plan Comments:   Induction:    Beta Blocker:  Patient is not currently on a Beta-Blocker (No further documentation required).       Informed Consent: Patient understands risks and agrees with Anesthesia plan.  Questions answered.   ASA Score: 3     Day of Surgery Review of History & Physical:            Ready For Surgery From Anesthesia Perspective.

## 2017-08-09 NOTE — ASSESSMENT & PLAN NOTE
Plan for EUA today for hemorrhoidectomy and possible sphincterotomy. Plan to do today in OR per Dr Bolton. The procedure was discussed in detail, including risks and alternatives. The patient voices understanding and all questions were answered. The patient agrees to proceed as planned.

## 2017-08-09 NOTE — TRANSFER OF CARE
"Anesthesia Transfer of Care Note    Patient: Mariah Boggs    Procedure(s) Performed: Procedure(s) (LRB):  EXAM UNDER ANESTHESIA (N/A)  HEMORRHOIDECTOMY (N/A)    Patient location: PACU    Anesthesia Type: general    Transport from OR: Transported from OR on room air with adequate spontaneous ventilation    Post pain: adequate analgesia    Post assessment: no apparent anesthetic complications    Post vital signs: stable    Level of consciousness: awake, alert and oriented    Nausea/Vomiting: nausea and no vomiting    Complications: none    Transfer of care protocol was followed      Last vitals:   Visit Vitals  /89 (BP Location: Right arm, BP Method: Automatic)   Pulse 88   Temp 36.6 °C (97.9 °F) (Temporal)   Resp 18   Ht 5' 9" (1.753 m)   Wt 81.6 kg (180 lb)   LMP  (LMP Unknown)   SpO2 100%   BMI 26.58 kg/m²     "

## 2017-08-09 NOTE — ANESTHESIA POSTPROCEDURE EVALUATION
"Anesthesia Post Evaluation    Patient: Mariah Boggs    Procedure(s) Performed: Procedure(s) (LRB):  EXAM UNDER ANESTHESIA (N/A)  HEMORRHOIDECTOMY (N/A)    Final Anesthesia Type: general  Patient location during evaluation: PACU  Patient participation: Yes- Able to Participate  Level of consciousness: awake and alert  Post-procedure vital signs: reviewed and stable  Pain management: adequate  Airway patency: patent  PONV status at discharge: No PONV  Anesthetic complications: no      Cardiovascular status: blood pressure returned to baseline  Respiratory status: unassisted  Hydration status: euvolemic  Follow-up not needed.        Visit Vitals  BP (!) 152/106   Pulse 94   Temp 36.6 °C (97.9 °F) (Temporal)   Resp 18   Ht 5' 9" (1.753 m)   Wt 81.6 kg (180 lb)   LMP  (LMP Unknown)   SpO2 99%   BMI 26.58 kg/m²       Pain/Deisy Score: Pain Assessment Performed: Yes (8/9/2017  1:45 PM)  Presence of Pain: complains of pain/discomfort (8/9/2017  1:45 PM)  Pain Rating Prior to Med Admin: 6 (8/9/2017  2:00 PM)  Deisy Score: 10 (8/9/2017  1:45 PM)      "

## 2017-08-09 NOTE — HPI
Mariah Boggs is a 42 y.o. female with intermittent constipation/diarrhea who has had several issues with hemorrhoids in the past. She has had hemorrhoid thrombectomy several times in the past. She presented to the ED with acute rectal pain that began 3 days ago after episode of diarrhea. Pain is associated with a small amount of bright red blood per rectum. Pain is severe, searing, burning in nature and worse after a BM.

## 2017-08-09 NOTE — DISCHARGE INSTRUCTIONS
Need to do Sitz bath twice a day and after each bowel movement for 15 minutes.       Discharge Instructions for Hemorrhoid Surgery  You had surgery to remove hemorrhoids. These are large, swollen veins inside and outside the anus. Hemorrhoids are caused by too much pressure on the anus. This is often due to straining during bowel movements or pressure during pregnancy. After surgery, it may take a few weeks or longer to recover. This sheet tells you how to care for yourself once youre home.   Home care  You may have some bleeding, discharge, or itching for a short time after surgery. This is common. Once at home, be sure to:  · Take prescribed pain medicines on time as directed. Dont skip doses or wait until pain gets bad, as it may be harder to control.  · Take sitz baths. Fill a tub with 3 inches of warm water. Sit in the basin or tub for 10 to 20 minutes a few times a day and after each bowel movement.  · Avoid straining to pass stool. This can increase pressure on the anus. It can also lead to swelling.  · Avoid constipation:  ¨ Use a laxative or stool softener as advised.  ¨ Eat more high-fiber foods. These include whole grains, fruit, and veggies.  ¨ Drink plenty of fluids.  · Avoid heavy lifting and strenuous activity for 1 to 2 weeks.  · Use suppositories and pads, if needed. These can help relieve symptoms.  · Avoid driving until youre able to sit and move without pain. Ask someone to drive you to appointments, if needed.  · Practice good bowel habits. Dont ignore the urge to go. But avoid spending too much time on the toilet.  Follow-up  Youll have a follow-up visit with the healthcare provider. During this visit, the healthcare provider will check how well youre healing. This visit will likely happen within 1 to 2 weeks.  When to call your healthcare provider  Call your healthcare provider right away if you have any of the following:  · Fever of 100.4°F (38.0°C) or higher, or as directed by your  healthcare provider  · A large amount of drainage or bleeding from the rectum  · Trouble urinating  · No bowel movement for more than 48 hours   Date Last Reviewed: 7/1/2016 © 2000-2016 [employer]. [address] [place] [address], [place] [address]. All rights reserved. This information is not intended as a substitute for professional medical care. Always follow your healthcare professional's instructions.    Taking a Sitz Bath  A sitz bath is a type of therapy done by sitting in warm, shallow water. It can help soothe pain, itching, and other symptoms in the anal and genital areas. It can also help keep these areas clean if you cant take a bath or shower. Sitz is from the [place]ian word sitzen, which means to sit.  Why a sitz bath is done  A sitz bath helps clean and treat certain problems in the anal area, genital area, and the perineum. The perineum is the area between the anus and the vulva in women. In men, it is between the anus and the scrotum. A sitz bath helps increase blood flow to these areas and relax the muscles.  A sitz bath may be done to:  · Help ease pain and itching from hemorrhoids  · Help ease pain from an anal fissure  · Bathe and soothe the perineum after childbirth  · Clean and soothe the anal area or perineum after surgery  · Ease prostate pain during prostatitis or after a procedure  · Help ease period cramps  · Clean the anal and genital areas if you cant take a bath or shower  How a sitz bath is done  A sitz bath can be done in 2 ways: in a bathtub or using a sitz bath bowl.  In a bathtub  To take a sitz bath in a tub:  · Make sure your bathtub is clean. Fill a clean bathtub with 3 to 4 inches of warm water. In some cases, your healthcare provider may tell you to use cold water instead.  · Add salt or medicine to the water if advised by your healthcare provider.  · Gently lower yourself down into the bathtub and sit on the bottom of the tub. Dont get into the bath unless the water  temperature is comfortable.  · Hold on to a railing. Or ask for help from a family member, friend, or caregiver if needed.  If you have a wound, the water may cause pain at first. But the pain should ease. Make sure the area that needs treating is under the water. You can bend your knees up to help expose the area that needs contact with the water.  Using a sitz bath bowl  A sitz bath bowl is a special plastic container thats placed on a toilet. You can buy this in many [employer] and medical supply stores. To take a sitz bath this way:  · Lift the toilet lid and seat. Place a cleaned plastic sitz bath bowl on the rim of your toilet. Make sure the bowl is firmly in place and wont move around.  · Fill the sitz bath bowl with warm water from a pitcher or other container. The water should cover your perineum. Make sure the water temperature is comfortable.  · Add salt or medicine to the water if advised by your healthcare provider. Or follow the package instructions about how to fill the bowl. Some kits come with a plastic bag and tubing. This lets you stream water into the bowl and at the area of your body that needs treatment. The bowl may have a slot or hole in the back. This lets water flow out so it doesnt overflow onto the floor. If there is no hole, be careful not to fill the bowl too full.  · Gently sit down on the sitz bath bowl. Hold on to a railing. Or ask for help from a family member, friend, or caregiver if needed.  If you have a wound, the water may cause pain at first, but the pain should ease. Make sure the area that needs treating is under the water.  For any type of sitz bath:  1. Sit in the water for 10 to 20 minutes.  2. Add more warm water as needed to keep the water comfortable.  3. Get up slowly from the tub or toilet. You may feel lightheaded or dizzy. Hold on to a railing. Or ask for help from a family member, friend, or caregiver if needed.  4. Gently pat your anal area, perineum, and  genitals dry with a clean towel. Dont rub the area.  5. Wash your hands. Put any ointment or cream on the area, if advised.  6. Wash the bathtub or sitz bath bowl with soap and water after each use.  7. Use a sitz bath 2 to 3 times a day, or as often as your healthcare provider advises.  When to call your healthcare provider  Call your healthcare provider right away if you have any of these:  · Fever of 100.4°F (38°C) or higher, or as directed  · Redness, swelling, or fluid leaking from a cut (incision) that gets worse  · Pain that gets worse  · Symptoms that dont get better, or get worse  · New symptoms   Date Last Reviewed: 5/1/2016 © 2000-2016 [employer]. [address] [place] [address], [place] [address]. All rights reserved. This information is not intended as a substitute for professional medical care. Always follow your healthcare professional's instructions.        General Information:    1. Do not drink alcoholic beverages including beer for 24 hours or as long as you are on pain medication..  2. Do not drive a motor vehicle, operate machinery or power tools, or signs legal papers for 24 hours or as long as you are on pain medication.   3. You may experience light-headedness, dizziness, and sleepiness following surgery. Please do not stay alone. A responsible adult should be with you for this 24 hour period.  4. Go home and rest.  5. Progress slowly to a normal diet unless instructed.  Otherwise, begin with liquids such as soft drinks, then soup and crackers working up to solid foods. Drink plenty of nonalcoholic fluids.  6. Certain anesthetics and pain medications produce nausea and vomiting in certain individuals. If nausea becomes a problem at home, call you doctor.  7. A nurse will be calling you sometime after surgery. Do not be alarmed. This is our way of finding out how you are doing.  8. Several times every hour while you are awake, take 2-3 deep breaths and cough. If you had stomach  surgery hold a pillow or rolled towel firmly against your stomach before you cough. This will help with any pain the cough might cause.  9. Several times every hour while you are awake, pump and flex your feet 5-6 times and do foot circles. This will help prevent blood clots.  10. Call your doctor for severe pain, bleeding, fever, or signs or symptoms of infection (pain, swelling, redness, foul odor, drainage).  11. You can contact your doctor anytime by [phone] for the [facility] (at [facility]) or [phone] for the [facility] on [facility].   [email or web address] is another way to contact your doctor if you are an active participant online with [facility].        Acetaminophen; Hydrocodone tablets or capsules  What is this medicine?  ACETAMINOPHEN; HYDROCODONE (a set a JOSE lindy fen; chi droe KOE done) is a pain reliever. It is used to treat moderate to severe pain.  How should I use this medicine?  Take this medicine by mouth with a glass of water. Follow the directions on the prescription label. You can take it with or without food. If it upsets your stomach, take it with food. Do not take your medicine more often than directed.  A special MedGuide will be given to you by the pharmacist with each prescription and refill. Be sure to read this information carefully each time.  Talk to your pediatrician regarding the use of this medicine in children. Special care may be needed.  What side effects may I notice from receiving this medicine?  Side effects that you should report to your doctor or health care professional as soon as possible:  · allergic reactions like skin rash, itching or hives, swelling of the face, lips, or tongue  · breathing problems  · confusion  · redness, blistering, peeling or loosening of the skin, including inside the mouth  · signs and symptoms of low blood pressure like dizziness; feeling faint or lightheaded, falls; unusually weak or tired  · trouble passing urine  or change in the amount of urine  · yellowing of the eyes or skin  Side effects that usually do not require medical attention (report to your doctor or health care professional if they continue or are bothersome):  · constipation  · dry mouth  · nausea, vomiting  · tiredness  What may interact with this medicine?  This medicine may interact with the following medications:  · alcohol  · antiviral medicines for HIV or AIDS  · atropine  · antihistamines for allergy, cough and cold  · certain antibiotics like erythromycin, clarithromycin  · certain medicines for anxiety or sleep  · certain medicines for bladder problems like oxybutynin, tolterodine  · certain medicines for depression like amitriptyline, fluoxetine, sertraline  · certain medicines for fungal infections like ketoconazole and itraconazole  · certain medicines for Parkinson's disease like benztropine, trihexyphenidyl  · certain medicines for seizures like carbamazepine, phenobarbital, phenytoin, primidone  · certain medicines for stomach problems like dicyclomine, hyoscyamine  · certain medicines for travel sickness like scopolamine  · general anesthetics like halothane, isoflurane, methoxyflurane, propofol  · ipratropium  · local anesthetics like lidocaine, pramoxine, tetracaine  · MAOIs like Carbex, Eldepryl, Marplan, Nardil, and Parnate  · medicines that relax muscles for surgery  · other medicines with acetaminophen  · other narcotic medicines for pain or cough  · phenothiazines like chlorpromazine, mesoridazine, prochlorperazine, thioridazine  · rifampin  What if I miss a dose?  If you miss a dose, take it as soon as you can. If it is almost time for your next dose, take only that dose. Do not take double or extra doses.  Where should I keep my medicine?  Keep out of the reach of children. This medicine can be abused. Keep your medicine in a safe place to protect it from theft. Do not share this medicine with anyone. Selling or giving away this  medicine is dangerous and against the law.  This medicine may cause accidental overdose and death if it taken by other adults, children, or pets. Mix any unused medicine with a substance like cat litter or coffee grounds. Then throw the medicine away in a sealed container like a sealed bag or a coffee can with a lid. Do not use the medicine after the expiration date.  Store at room temperature between 15 and 30 degrees C (59 and 86 degrees F).  What should I tell my health care provider before I take this medicine?  They need to know if you have any of these conditions:  · brain tumor  · Crohn's disease, inflammatory bowel disease, or ulcerative colitis  · drug abuse or addiction  · head injury  · heart or circulation problems  · if you often drink alcohol  · kidney disease or problems going to the bathroom  · liver disease  · lung disease, asthma, or breathing problems  · an unusual or allergic reaction to acetaminophen, hydrocodone, other opioid analgesics, other medicines, foods, dyes, or preservatives  · pregnant or trying to get pregnant  · breast-feeding  What should I watch for while using this medicine?  Tell your doctor or health care professional if your pain does not go away, if it gets worse, or if you have new or a different type of pain. You may develop tolerance to the medicine. Tolerance means that you will need a higher dose of the medicine for pain relief. Tolerance is normal and is expected if you take the medicine for a long time.  Do not suddenly stop taking your medicine because you may develop a severe reaction. Your body becomes used to the medicine. This does NOT mean you are addicted. Addiction is a behavior related to getting and using a drug for a non-medical reason. If you have pain, you have a medical reason to take pain medicine. Your doctor will tell you how much medicine to take. If your doctor wants you to stop the medicine, the dose will be slowly lowered over time to avoid any  side effects.  There are different types of narcotic medicines (opiates). If you take more than one type at the same time or if you are taking another medicine that also causes drowsiness, you may have more side effects. Give your health care provider a list of all medicines you use. Your doctor will tell you how much medicine to take. Do not take more medicine than directed. Call emergency for help if you have problems breathing or unusual sleepiness.  Do not take other medicines that contain acetaminophen with this medicine. Always read labels carefully. If you have questions, ask your doctor or pharmacist.  If you take too much acetaminophen get medical help right away. Too much acetaminophen can be very dangerous and cause liver damage. Even if you do not have symptoms, it is important to get help right away.  You may get drowsy or dizzy. Do not drive, use machinery, or do anything that needs mental alertness until you know how this medicine affects you. Do not stand or sit up quickly, especially if you are an older patient. This reduces the risk of dizzy or fainting spells. Alcohol may interfere with the effect of this medicine. Avoid alcoholic drinks.  The medicine will cause constipation. Try to have a bowel movement at least every 2 to 3 days. If you do not have a bowel movement for 3 days, call your doctor or health care professional.  Your mouth may get dry. Chewing sugarless gum or sucking hard candy, and drinking plenty of water may help. Contact your doctor if the problem does not go away or is severe.  Date Last Reviewed:   NOTE:This sheet is a summary. It may not cover all possible information. If you have questions about this medicine, talk to your doctor, pharmacist, or health care provider. Copyright© 2016 [employer]

## 2017-08-09 NOTE — PLAN OF CARE
Pt and pt boyfriend given discharge instructions. Both stated understanding. Pt brought to main entrance via wheelchair with RN.

## 2017-08-09 NOTE — H&P
[facility] -   General Surgery  History & Physical    Patient Name: Mariah Boggs  MRN: [id number]  Admission Date: 8/9/2017  Attending Physician: No att. providers found   Primary Care Provider: Abbie Portillo MD    Patient information was obtained from patient, past medical records and ER records.     Subjective:     Chief Complaint/Reason for Admission: external hemorrhoids, anal fissure    History of Present Illness: Mariah Boggs is a 42 y.o. female with intermittent constipation/diarrhea who has had several issues with hemorrhoids in the past. She has had hemorrhoid thrombectomy several times in the past. She presented to the ED with acute rectal pain that began 3 days ago after episode of diarrhea. Pain is associated with a small amount of bright red blood per rectum. Pain is severe, searing, burning in nature and worse after a BM.     Current Facility-Administered Medications on File Prior to Encounter   Medication    levalbuterol nebulizer solution 1.25 mg     Current Outpatient Prescriptions on File Prior to Encounter   Medication Sig    acyclovir (ZOVIRAX) 200 MG capsule Take one capsule by mouth three times daily    albuterol (VENTOLIN HFA) 90 mcg/actuation inhaler Inhale two puffs by mouth every four hours as needed  for wheezing    albuterol-ipratropium 2.5mg-0.5mg/3mL (DUO-NEB) 0.5 mg-3 mg(2.5 mg base)/3 mL nebulizer solution INHALE ONE VIAL VIA NEBULIZER EVERY SIX HOURS AS NEEDED FOR WHEEZING.     diclofenac sodium (VOLTAREN) 1 % Gel Apply 2 g topically 4 (four) times daily.    diclofenac-misoprostol  mg-mcg (ARTHROTEC 50)  mg-mcg per tablet Take 1 tablet by mouth 2 (two) times daily.    estradiol (ESTRACE) 2 MG tablet Take 1 tablet (2 mg total) by mouth once daily.    famotidine (PEPCID) 40 MG tablet Take 40 mg by mouth once daily.     fluticasone (FLONASE ALLERGY RELIEF) 50 mcg/actuation nasal spray 2 sprays by Each Nare route once daily.    gabapentin  (NEURONTIN) 600 MG tablet take 1 tablet by mouth four times a day    hydroxychloroquine (PLAQUENIL) 200 mg tablet Take 1 tablet (200 mg total) by mouth once daily.    lidocaine-prilocaine (EMLA) cream Apply topically as needed.    tizanidine (ZANAFLEX) 4 MG tablet Take 1-2 tablets (4-8 mg total) by mouth 3 (three) times daily as needed.    topiramate (TOPAMAX) 100 MG tablet Take 100 mg by mouth every 12 (twelve) hours as needed.    [DISCONTINUED] omeprazole (PRILOSEC) 20 MG capsule Take 1 capsule (20 mg total) by mouth once daily.    [DISCONTINUED] promethazine (PHENERGAN) 6.25 mg/5 mL syrup as needed.       Review of patient's allergies indicates:   Allergen Reactions    Metoclopramide Hives, Itching and Shortness Of Breath    Phenergan [promethazine] Other (See Comments)     Pt states she can not tolerated phenergen via IV ONLY     Cortisone Nausea And Vomiting    Percocet [oxycodone-acetaminophen] Itching       Past Medical History:   Diagnosis Date    Abnormal Pap smear of cervix     ADHD (attention deficit hyperactivity disorder)     Allergy     Anemia     Anxiety     Arthritis     Cervical cancer     hysterectomy     Chronic kidney disease     Depression     Wander Barr virus infection     at 11 years old    Fatigue     chronic fatigue syndrome    Heart murmur     Hydronephrosis of right kidney     Lung disease     Lupus     Mixed restrictive and obstructive lung disease 2014    Nephrolithiasis     Pneumonia     PONV (postoperative nausea and vomiting)     PTSD (post-traumatic stress disorder)     Rash     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)    Tremor 2014     Past Surgical History:   Procedure Laterality Date    ADENOIDECTOMY      CERVICAL BIOPSY  W/ LOOP ELECTRODE EXCISION      CLAVICLE SURGERY Left     x2    COSMETIC SURGERY      HYSTERECTOMY      A2    SHOULDER SURGERY Left     TONSILLECTOMY       Family History     Problem Relation  (Age of Onset)    Asthma Mother, Father    Cancer Maternal Aunt, Maternal Grandmother, Maternal Grandfather    Diabetes Paternal Grandfather    Emphysema Maternal Grandmother    Fibromyalgia Mother    Hypertension Mother    Lupus Mother    Mental illness Mother        Social History Main Topics    Smoking status: Current Every Day Smoker     Packs/day: 0.50     Years: 25.00    Smokeless tobacco: Current User      Comment: trying to quit    Alcohol use No    Drug use: No    Sexual activity: Yes     Partners: Male     Birth control/ protection: See Surgical Hx     Review of Systems   Constitutional: Negative for activity change, chills and fever.   Respiratory: Negative for shortness of breath.    Cardiovascular: Negative for chest pain.   Gastrointestinal: Positive for blood in stool, constipation, diarrhea and rectal pain. Negative for abdominal pain, nausea and vomiting.   Genitourinary: Negative for dysuria.   Musculoskeletal: Negative for myalgias.   Skin: Negative for wound.   Neurological: Negative for weakness.   Hematological: Does not bruise/bleed easily.   Psychiatric/Behavioral: The patient is not nervous/anxious.      Objective:     Vital Signs (Most Recent):  Temp: 98.1 °F (36.7 °C) (08/09/17 0643)  Pulse: 96 (08/09/17 0643)  Resp: 18 (08/09/17 0643)  BP: 127/83 (08/09/17 0643)  SpO2: 100 % (08/09/17 0643) Vital Signs (24h Range):  Temp:  [98.1 °F (36.7 °C)] 98.1 °F (36.7 °C)  Pulse:  [96] 96  Resp:  [18] 18  SpO2:  [100 %] 100 %  BP: (127)/(83) 127/83     Weight: 81.6 kg (180 lb)  Body mass index is 26.58 kg/m².    Physical Exam   Constitutional: She appears well-developed and well-nourished.   HENT:   Head: Normocephalic and atraumatic.   Eyes: EOM are normal.   Cardiovascular: Normal rate and regular rhythm.    Pulmonary/Chest: No respiratory distress.   Abdominal: Soft.   Multiple external hemorrhoids, some with thrombosis. She is very tender at 12'oclock area and likely has a fissure. Exam was  limited due to severe pain with exam.    Musculoskeletal: Normal range of motion.   Skin: Skin is warm and dry.   Psychiatric: She has a normal mood and affect. Thought content normal.   Vitals reviewed.      Significant Labs:  CBC:   Recent Labs  Lab 08/09/17  0737   WBC 7.73   RBC 4.49   HGB 13.6   HCT 39.5      MCV 88   MCH 30.3   MCHC 34.4     CMP:   Recent Labs  Lab 08/09/17  0737      CALCIUM 9.6   ALBUMIN 3.7   PROT 7.8      K 4.0   CO2 20*      BUN 12   CREATININE 1.0   ALKPHOS 89   ALT 49*   AST 17   BILITOT 0.3       Significant Diagnostics:  I have reviewed all pertinent imaging results/findings within the past 24 hours.    Assessment/Plan:     Anal fissure    Plan for EUA today for hemorrhoidectomy and possible sphincterotomy. Plan to do today in OR per Dr Bolton. The procedure was discussed in detail, including risks and alternatives. The patient voices understanding and all questions were answered. The patient agrees to proceed as planned.            VTE Risk Mitigation     None          Nayana Bhardwaj PA-C  General Surgery  [facility] [place]

## 2017-08-09 NOTE — DISCHARGE SUMMARY
[facility]  Short Stay  Discharge Summary    Admit Date: 8/9/2017    Discharge Date and Time: 8/9/2017      Discharge Attending Physician: Baljeet Bolton MD    Reason for Admission: Thrombosed hemorrhoids.    Hospital Course (synopsis of major diagnoses, care, treatment, and services provided during the course of the hospital stay): Uneventful and full recovery    Final Diagnoses:    Principal Problem: External hemorrhoid, thrombosed   Secondary Diagnoses: External hemorrhoid, thrombosed    Procedures Performed: Procedure(s) (LRB):  EXAM UNDER ANESTHESIA (N/A)  HEMORRHOIDECTOMY (N/A)      Discharged Condition: good    Disposition: Home or Self Care    Discharge Condition: Stable    Follow up/Patient Instructions:  Follow-up Information     Baljeet Bolton MD On 8/29/2017.    Specialty:  General Surgery  Contact information:  [address]  [place] [address] [phone]                   Medications:      Medication List      START taking these medications    DILTIAZEM 2% CREAM  Apply topically 3 (three) times daily. Apply topically to anal area.     docusate sodium 100 MG capsule  Commonly known as:  COLACE  Take 1 capsule (100 mg total) by mouth 2 (two) times daily as needed for Constipation.     hydrocodone-acetaminophen 10-325mg  mg Tab  Commonly known as:  NORCO  Take 1 tablet by mouth every 4 (four) hours as needed.     lidocaine 5 % Crea  Apply 1 application topically 3 (three) times daily as needed.     meperidine 50 mg tablet  Commonly known as:  DEMEROL  Take 1 tablet (50 mg total) by mouth every 4 (four) hours as needed for Pain.        CONTINUE taking these medications    acyclovir 200 MG capsule  Commonly known as:  ZOVIRAX  Take one capsule by mouth three times daily     albuterol 90 mcg/actuation inhaler  Commonly known as:  VENTOLIN HFA  Inhale two puffs by mouth every four hours as needed  for wheezing     albuterol-ipratropium 2.5mg-0.5mg/3mL 0.5 mg-3 mg(2.5 mg base)/3 mL nebulizer  solution  Commonly known as:  DUO-NEB  INHALE ONE VIAL VIA NEBULIZER EVERY SIX HOURS AS NEEDED FOR WHEEZING.     diclofenac sodium 1 % Gel  Commonly known as:  VOLTAREN  Apply 2 g topically 4 (four) times daily.     diclofenac-misoprostol  mg-mcg  mg-mcg per tablet  Commonly known as:  ARTHROTEC 50  Take 1 tablet by mouth 2 (two) times daily.     estradiol 2 MG tablet  Commonly known as:  ESTRACE  Take 1 tablet (2 mg total) by mouth once daily.     famotidine 40 MG tablet  Commonly known as:  PEPCID     fluticasone 50 mcg/actuation nasal spray  Commonly known as:  FLONASE ALLERGY RELIEF  2 sprays by Each Nare route once daily.     gabapentin 600 MG tablet  Commonly known as:  NEURONTIN  take 1 tablet by mouth four times a day     hydroxychloroquine 200 mg tablet  Commonly known as:  PLAQUENIL  Take 1 tablet (200 mg total) by mouth once daily.     lidocaine-prilocaine cream  Commonly known as:  EMLA  Apply topically as needed.     tizanidine 4 MG tablet  Commonly known as:  ZANAFLEX  Take 1-2 tablets (4-8 mg total) by mouth 3 (three) times daily as needed.     topiramate 100 MG tablet  Commonly known as:  TOPAMAX        STOP taking these medications    promethazine 6.25 mg/5 mL syrup  Commonly known as:  PHENERGAN     PROTONIX ORAL           Where to Get Your Medications      These medications were sent to [id number]  [address] [address]    Phone:  [phone]   · meperidine 50 mg tablet     You can get these medications from any pharmacy    Bring a paper prescription for each of these medications  · DILTIAZEM 2% CREAM  · docusate sodium 100 MG capsule  · hydrocodone-acetaminophen 10-325mg  mg Tab  · lidocaine 5 % Crea         Discharge Procedure Orders  Kolby Bolton

## 2017-08-09 NOTE — OP NOTE
"Operative Note       SURGERY DATE:  08/09/2017    PRE-OP DIAGNOSIS:  Anal fissure [K60.2]  Acute hemorrhoid [K64.9]    POST-OP DIAGNOSIS:  Anal fissure [K60.2]  Acute hemorrhoid [K64.9]    Procedure(s) (LRB):  EXAM UNDER ANESTHESIA (N/A)  HEMORRHOIDECTOMY (N/A)    Surgeon(s) and Role:     * Baljeet Bolton MD - Primary    ASSISTANTS: None  ANESTHESIA: General    FINDINGS: Two thrombosed hemorrhoids on the left anterior and right posterior columns were identified. No anal fissure was found but the thrombosed hemorrhoid on the right posterior column was found to have ulceration, not fissure.    ESTIMATED BLOOD LOSS: 5 mL              COMPLICATIONS:  None    SPECIMEN:  Hemorrhoid    Implants: None    INDICATION:  Thrombosed hemorrhoids.    DESCRIPTION OF PROCEDURE:    The patient was taken to the operating room and under went general anesthesia with orotracheal intubation. Once the patient was sleep, a "time-out" was called, the patient's ID, the site of surgery, the names of participants, and the planned surgery were confirmed prior to making the incision. The patient was placed in a prone bonnie-knife position and the buttock cheeks were  and taped to the bed. The area was prepped in the usual fashion. A thrombosed external hemorrhoid was noted on the left anterior column. A small incision was made in the anocutaneous junction, and dissected the hemorrhoidal cushion off the internal sphincter, and dissected into the anal canal. The hemorrhoid was excised sharply and the hemostasis was achieved with simple pressure for a minute. Then the 3-0 Chromic Catgut was used to closed the defect from outside in with intermittent locking of suture. The suture was tied in the anal canal. This procedure was repeated for the lesion on the right posterior column. The hemostasis was checked and a roll of Gelfoam was inserted. The local infiltration around the anus was done with 0.25% Marcaine. The procedure was well tolerated, and " the patient was later awakened. The EBL was minimal.           CONDITION: Good    DISPOSITION: PACU - hemodynamically stable.     Baljeet Bolton

## 2017-08-09 NOTE — SUBJECTIVE & OBJECTIVE
Current Facility-Administered Medications on File Prior to Encounter   Medication    levalbuterol nebulizer solution 1.25 mg     Current Outpatient Prescriptions on File Prior to Encounter   Medication Sig    acyclovir (ZOVIRAX) 200 MG capsule Take one capsule by mouth three times daily    albuterol (VENTOLIN HFA) 90 mcg/actuation inhaler Inhale two puffs by mouth every four hours as needed  for wheezing    albuterol-ipratropium 2.5mg-0.5mg/3mL (DUO-NEB) 0.5 mg-3 mg(2.5 mg base)/3 mL nebulizer solution INHALE ONE VIAL VIA NEBULIZER EVERY SIX HOURS AS NEEDED FOR WHEEZING.     diclofenac sodium (VOLTAREN) 1 % Gel Apply 2 g topically 4 (four) times daily.    diclofenac-misoprostol  mg-mcg (ARTHROTEC 50)  mg-mcg per tablet Take 1 tablet by mouth 2 (two) times daily.    estradiol (ESTRACE) 2 MG tablet Take 1 tablet (2 mg total) by mouth once daily.    famotidine (PEPCID) 40 MG tablet Take 40 mg by mouth once daily.     fluticasone (FLONASE ALLERGY RELIEF) 50 mcg/actuation nasal spray 2 sprays by Each Nare route once daily.    gabapentin (NEURONTIN) 600 MG tablet take 1 tablet by mouth four times a day    hydroxychloroquine (PLAQUENIL) 200 mg tablet Take 1 tablet (200 mg total) by mouth once daily.    lidocaine-prilocaine (EMLA) cream Apply topically as needed.    tizanidine (ZANAFLEX) 4 MG tablet Take 1-2 tablets (4-8 mg total) by mouth 3 (three) times daily as needed.    topiramate (TOPAMAX) 100 MG tablet Take 100 mg by mouth every 12 (twelve) hours as needed.    [DISCONTINUED] omeprazole (PRILOSEC) 20 MG capsule Take 1 capsule (20 mg total) by mouth once daily.    [DISCONTINUED] promethazine (PHENERGAN) 6.25 mg/5 mL syrup as needed.       Review of patient's allergies indicates:   Allergen Reactions    Metoclopramide Hives, Itching and Shortness Of Breath    Phenergan [promethazine] Other (See Comments)     Pt states she can not tolerated phenergen via IV ONLY     Cortisone Nausea And  Vomiting    Percocet [oxycodone-acetaminophen] Itching       Past Medical History:   Diagnosis Date    Abnormal Pap smear of cervix     ADHD (attention deficit hyperactivity disorder)     Allergy     Anemia     Anxiety     Arthritis     Cervical cancer     hysterectomy     Chronic kidney disease     Depression     Wander Barr virus infection     at 11 years old    Fatigue     chronic fatigue syndrome    Heart murmur     Hydronephrosis of right kidney     Lung disease     Lupus     Mixed restrictive and obstructive lung disease 2014    Nephrolithiasis     Pneumonia     PONV (postoperative nausea and vomiting)     PTSD (post-traumatic stress disorder)     Rash     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)    Tremor 2014     Past Surgical History:   Procedure Laterality Date    ADENOIDECTOMY      CERVICAL BIOPSY  W/ LOOP ELECTRODE EXCISION      CLAVICLE SURGERY Left     x2    COSMETIC SURGERY      HYSTERECTOMY      A2    SHOULDER SURGERY Left     TONSILLECTOMY       Family History     Problem Relation (Age of Onset)    Asthma Mother, Father    Cancer Maternal Aunt, Maternal Grandmother, Maternal Grandfather    Diabetes Paternal Grandfather    Emphysema Maternal Grandmother    Fibromyalgia Mother    Hypertension Mother    Lupus Mother    Mental illness Mother        Social History Main Topics    Smoking status: Current Every Day Smoker     Packs/day: 0.50     Years: 25.00    Smokeless tobacco: Current User      Comment: trying to quit    Alcohol use No    Drug use: No    Sexual activity: Yes     Partners: Male     Birth control/ protection: See Surgical Hx     Review of Systems   Constitutional: Negative for activity change, chills and fever.   Respiratory: Negative for shortness of breath.    Cardiovascular: Negative for chest pain.   Gastrointestinal: Positive for blood in stool, constipation, diarrhea and rectal pain. Negative for abdominal pain, nausea  and vomiting.   Genitourinary: Negative for dysuria.   Musculoskeletal: Negative for myalgias.   Skin: Negative for wound.   Neurological: Negative for weakness.   Hematological: Does not bruise/bleed easily.   Psychiatric/Behavioral: The patient is not nervous/anxious.      Objective:     Vital Signs (Most Recent):  Temp: 98.1 °F (36.7 °C) (08/09/17 0643)  Pulse: 96 (08/09/17 0643)  Resp: 18 (08/09/17 0643)  BP: 127/83 (08/09/17 0643)  SpO2: 100 % (08/09/17 0643) Vital Signs (24h Range):  Temp:  [98.1 °F (36.7 °C)] 98.1 °F (36.7 °C)  Pulse:  [96] 96  Resp:  [18] 18  SpO2:  [100 %] 100 %  BP: (127)/(83) 127/83     Weight: 81.6 kg (180 lb)  Body mass index is 26.58 kg/m².    Physical Exam   Constitutional: She appears well-developed and well-nourished.   HENT:   Head: Normocephalic and atraumatic.   Eyes: EOM are normal.   Cardiovascular: Normal rate and regular rhythm.    Pulmonary/Chest: No respiratory distress.   Abdominal: Soft.   Multiple external hemorrhoids, some with thrombosis. She is very tender at 12'oclock area and likely has a fissure. Exam was limited due to severe pain with exam.    Musculoskeletal: Normal range of motion.   Skin: Skin is warm and dry.   Psychiatric: She has a normal mood and affect. Thought content normal.   Vitals reviewed.      Significant Labs:  CBC:   Recent Labs  Lab 08/09/17  0737   WBC 7.73   RBC 4.49   HGB 13.6   HCT 39.5      MCV 88   MCH 30.3   MCHC 34.4     CMP:   Recent Labs  Lab 08/09/17  0737      CALCIUM 9.6   ALBUMIN 3.7   PROT 7.8      K 4.0   CO2 20*      BUN 12   CREATININE 1.0   ALKPHOS 89   ALT 49*   AST 17   BILITOT 0.3       Significant Diagnostics:  I have reviewed all pertinent imaging results/findings within the past 24 hours.

## 2017-08-09 NOTE — ED PROVIDER NOTES
SCRIBE #1 NOTE: I, Franki Rodríguez, am scribing for, and in the presence of, Karen Blanca MD. I have scribed the entire note.      History      Chief Complaint   Patient presents with    Rectal Pain     reports feeling like her hemorrhoids have returned, reports increased pain to rectal area        Review of patient's allergies indicates:   Allergen Reactions    Metoclopramide Hives, Itching and Shortness Of Breath    Phenergan [promethazine] Other (See Comments)     Pt states she can not tolerated phenergen via IV ONLY     Cortisone Nausea And Vomiting    Percocet [oxycodone-acetaminophen] Itching        HPI   HPI    8/9/2017, 7:17 AM   History obtained from the patient      History of Present Illness: Mariah Boggs is a 42 y.o. female patient who presents to the Emergency Department for rectal pain which onset gradually a few days ago. Pt reports Hx of hemorrhoids which she has had drained multiple times in the past. Pt states her pain feels similar to when she had hemorrhoids in the past. Symptoms are constant and moderate in severity. Sx are exacerbated by nothing and relieved by nothing. Pt reports applying hemorrhoid cream with no relief. No other sxs reported. Patient denies any fever, N/V/D, chills, rectal bleeding, constipation, abd pain and all other sxs at this time. No further complaints or concerns at this time.     Arrival mode: Personal vehicle     PCP: Abbie Portillo MD       Past Medical History:  Past Medical History:   Diagnosis Date    Abnormal Pap smear of cervix     ADHD (attention deficit hyperactivity disorder)     Allergy     Anemia     Anxiety     Arthritis     Cervical cancer     hysterectomy     Chronic kidney disease     Depression     Wander Barr virus infection     at 11 years old    Fatigue     chronic fatigue syndrome    Heart murmur     Hydronephrosis of right kidney     Lung disease     Lupus     Mixed restrictive and obstructive lung disease  2014    Nephrolithiasis     Pneumonia     PONV (postoperative nausea and vomiting)     PTSD (post-traumatic stress disorder)     Rash     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)    Tremor 2014       Past Surgical History:  Past Surgical History:   Procedure Laterality Date    ADENOIDECTOMY      CERVICAL BIOPSY  W/ LOOP ELECTRODE EXCISION      CLAVICLE SURGERY Left     x2    COSMETIC SURGERY      HYSTERECTOMY      A2    SHOULDER SURGERY Left     TONSILLECTOMY           Family History:  Family History   Problem Relation Age of Onset    Fibromyalgia Mother     Lupus Mother     Mental illness Mother     Asthma Mother     Hypertension Mother     Cancer Maternal Aunt     Asthma Father     Cancer Maternal Grandmother     Emphysema Maternal Grandmother     Diabetes Paternal Grandfather     Cancer Maternal Grandfather        Social History:  Social History     Social History Main Topics    Smoking status: Current Every Day Smoker     Packs/day: 0.50     Years: 25.00    Smokeless tobacco: Current User      Comment: trying to quit    Alcohol use No    Drug use: No    Sexual activity: Yes     Partners: Male     Birth control/ protection: See Surgical Hx       ROS   Review of Systems   Constitutional: Negative for chills and fever.   HENT: Negative for congestion and sore throat.    Respiratory: Negative for chest tightness and shortness of breath.    Cardiovascular: Negative for chest pain.   Gastrointestinal: Negative for abdominal pain, constipation, diarrhea, nausea and vomiting.        (+)rectal pain   Genitourinary: Negative for difficulty urinating and dysuria.   Musculoskeletal: Negative for back pain and neck pain.   Skin: Negative for rash.   Neurological: Negative for dizziness, weakness, light-headedness, numbness and headaches.   Psychiatric/Behavioral: Negative for agitation and confusion.   All other systems reviewed and are negative.      Physical Exam   "    Initial Vitals [08/09/17 0643]   BP Pulse Resp Temp SpO2   127/83 96 18 98.1 °F (36.7 °C) 100 %      MAP       97.67          Physical Exam  Nursing Notes and Vital Signs Reviewed.  Constitutional: Patient is in no apparent distress. Well-developed and well-nourished.  Head: Atraumatic. Normocephalic.  Eyes: PERRL. EOM intact. Conjunctivae are not pale. No scleral icterus.  ENT: Mucous membranes are moist. Oropharynx is clear and symmetric.    Neck: Supple. Full ROM. No lymphadenopathy.  Cardiovascular: Regular rate. Regular rhythm. No murmurs, rubs, or gallops. Distal pulses are 2+ and symmetric.  Pulmonary/Chest: No respiratory distress. Clear to auscultation bilaterally. No wheezing, rales, or rhonchi.  Abdominal: Soft and non-distended.  There is no tenderness.  No rebound, guarding, or rigidity. Good bowel sounds.  Rectal: Multiple thrombosed external hemorrhoids noted.  Musculoskeletal: Moves all extremities. No obvious deformities. No edema. No calf tenderness.  Skin: Warm and dry.  Neurological:  Alert, awake, and appropriate.  Normal speech.  No acute focal neurological deficits are appreciated.  Psychiatric: Normal affect. Good eye contact. Appropriate in content.    ED Course    Procedures  ED Vital Signs:  Vitals:    08/09/17 0643 08/09/17 1040 08/09/17 1109 08/09/17 1230   BP: 127/83 108/74 123/89 139/81   Pulse: 96 83 88 78   Resp: 18 18 18 18   Temp: 98.1 °F (36.7 °C)  97.9 °F (36.6 °C) 97.9 °F (36.6 °C)   TempSrc: Oral  Temporal Temporal   SpO2: 100% 99% 100% 100%   Weight: 81.6 kg (180 lb)      Height: 5' 9" (1.753 m)       08/09/17 1235 08/09/17 1240 08/09/17 1245 08/09/17 1300   BP: 139/84 (!) 148/85 137/87 121/75   Pulse: 75 79 80 80   Resp: 18 15 (!) 21 17   Temp:       TempSrc:       SpO2: 96% 100% 100% 97%   Weight:       Height:        08/09/17 1315 08/09/17 1330 08/09/17 1345 08/09/17 1400   BP: 130/71 (!) 147/102 (!) 152/106 (!) 151/98   Pulse: 79 88 94 95   Resp: 19 13 18 16   Temp:    " 97.5 °F (36.4 °C)   TempSrc:    Temporal   SpO2: 100% 100% 99% 100%   Weight:       Height:           Abnormal Lab Results:  Labs Reviewed   CBC W/ AUTO DIFFERENTIAL - Abnormal; Notable for the following:        Result Value    MPV 8.9 (*)     All other components within normal limits   COMPREHENSIVE METABOLIC PANEL - Abnormal; Notable for the following:     CO2 20 (*)     ALT 49 (*)     All other components within normal limits   MAGNESIUM   APTT   PROTIME-INR        All Lab Results:  Results for orders placed or performed during the hospital encounter of 08/09/17   CBC auto differential   Result Value Ref Range    WBC 7.73 3.90 - 12.70 K/uL    RBC 4.49 4.00 - 5.40 M/uL    Hemoglobin 13.6 12.0 - 16.0 g/dL    Hematocrit 39.5 37.0 - 48.5 %    MCV 88 82 - 98 fL    MCH 30.3 27.0 - 31.0 pg    MCHC 34.4 32.0 - 36.0 g/dL    RDW 13.5 11.5 - 14.5 %    Platelets 300 150 - 350 K/uL    MPV 8.9 (L) 9.2 - 12.9 fL    Gran # 3.2 1.8 - 7.7 K/uL    Lymph # 3.2 1.0 - 4.8 K/uL    Mono # 1.0 0.3 - 1.0 K/uL    Eos # 0.2 0.0 - 0.5 K/uL    Baso # 0.05 0.00 - 0.20 K/uL    Gran% 41.6 38.0 - 73.0 %    Lymph% 41.8 18.0 - 48.0 %    Mono% 12.9 4.0 - 15.0 %    Eosinophil% 3.1 0.0 - 8.0 %    Basophil% 0.6 0.0 - 1.9 %    Differential Method Automated    Comprehensive metabolic panel   Result Value Ref Range    Sodium 139 136 - 145 mmol/L    Potassium 4.0 3.5 - 5.1 mmol/L    Chloride 108 95 - 110 mmol/L    CO2 20 (L) 23 - 29 mmol/L    Glucose 107 70 - 110 mg/dL    BUN, Bld 12 6 - 20 mg/dL    Creatinine 1.0 0.5 - 1.4 mg/dL    Calcium 9.6 8.7 - 10.5 mg/dL    Total Protein 7.8 6.0 - 8.4 g/dL    Albumin 3.7 3.5 - 5.2 g/dL    Total Bilirubin 0.3 0.1 - 1.0 mg/dL    Alkaline Phosphatase 89 55 - 135 U/L    AST 17 10 - 40 U/L    ALT 49 (H) 10 - 44 U/L    Anion Gap 11 8 - 16 mmol/L    eGFR if African American >60 >60 mL/min/1.73 m^2    eGFR if non African American >60 >60 mL/min/1.73 m^2   Magnesium   Result Value Ref Range    Magnesium 2.4 1.6 - 2.6 mg/dL    APTT   Result Value Ref Range    aPTT 25.4 21.0 - 32.0 sec   Protime-INR   Result Value Ref Range    Prothrombin Time 9.7 9.0 - 12.5 sec    INR 0.9 0.8 - 1.2         Imaging Results:  Imaging Results    None                 The Emergency Provider reviewed the vital signs and test results, which are outlined above.    ED Discussion     7:33 AM: Dr. Blanca discussed the pt's case with Dr. Jeansonne (General Surgery) who will come see the pt in the ED.    8:28 AM: ISABELL Rodriguez (General Surgery) is at the pt's bedside in the ED.     8:35 AM: Dr. Blanca discussed the pt's case with ISABELL Rodriguez (General Surgery) who recommends d/c pt with pain medication, diltiazem, and lidocaine cream and f/u with her in clinic.    9:06 AM: Dr. Bolton (General Surgery) is at pt's bedside in the ED.     9:15 AM: Discussed case with Dr. Bolton (General Surgery) who recommends admitting pt to Med Surg. Per Dr. Bolton, pt reports last eating last night and pt reported having 2 sips of water this morning. Dr. Bolton states pt is in too much pain to go home. Dr. Bolton agrees with current care and management of pt and accepts admission.   Admitting Service: General Surgery   Admitting Physician: Dr. Bolton  Admit to: Med Surg Obs    9:21 AM: Re-evaluated pt. I have discussed test results, shared treatment plan, and the need for admission with patient and family at bedside. Pt and family express understanding at this time and agree with all information. All questions answered. Pt and family have no further questions or concerns at this time. Pt is ready for admit.      ED Medication(s):  Medications   0.9%  NaCl infusion (not administered)   sodium chloride 0.9% flush 3 mL (not administered)   ondansetron injection 4 mg (not administered)   0.9%  NaCl infusion (not administered)   hydrocodone-acetaminophen 5-325mg per tablet 1 tablet (not administered)   ondansetron injection 8 mg (8 mg Intravenous Given 8/9/17 0736)   fentaNYL injection 50 mcg (50 mcg  Intravenous Given 8/9/17 6435)   acetaminophen (10 mg/mL) injection 1,000 mg (1,000 mg Intravenous New Bag 8/9/17 1357)   morphine injection 2 mg (2 mg Intravenous Given 8/9/17 1315)   hydromorphone (PF) injection 0.2 mg (0.2 mg Intravenous Given 8/9/17 1400)       Discharge Medication List as of 8/9/2017  2:13 PM      START taking these medications    Details   DILTIAZEM HCL (DILTIAZEM 2% CREAM) Apply topically 3 (three) times daily. Apply topically to anal area., Starting Wed 8/9/2017, Print      docusate sodium (COLACE) 100 MG capsule Take 1 capsule (100 mg total) by mouth 2 (two) times daily as needed for Constipation., Starting Wed 8/9/2017, Print      hydrocodone-acetaminophen 10-325mg (NORCO)  mg Tab Take 1 tablet by mouth every 4 (four) hours as needed for Pain., Starting Wed 8/9/2017, Normal      lidocaine 5 % Crea Apply 1 application topically 3 (three) times daily as needed., Starting Wed 8/9/2017, Print      meperidine (DEMEROL) 50 mg tablet Take 1 tablet (50 mg total) by mouth every 4 (four) hours as needed for Pain., Starting Wed 8/9/2017, Normal      !! oxycodone-acetaminophen (PERCOCET)  mg per tablet Take 1 tablet by mouth every 4 (four) hours as needed for Pain., Starting Wed 8/9/2017, Print      !! oxycodone-acetaminophen (PERCOCET)  mg per tablet Take 1 tablet by mouth every 4 (four) hours as needed for Pain., Starting Wed 8/9/2017, Print       !! - Potential duplicate medications found. Please discuss with provider.          Follow-up Information     Baljeet Bolton MD On 8/29/2017.    Specialty:  General Surgery  Contact information:  [address]  [place] [address] [phone]                     Medical Decision Making    Medical Decision Making:   Clinical Tests:   Lab Tests: Ordered and Reviewed           Scribe Attestation:   Scribe #1: I performed the above scribed service and the documentation accurately describes the services I performed. I attest to the accuracy of  the note.    Attending:   Physician Attestation Statement for Scribe #1: I, Karen Blanca MD, personally performed the services described in this documentation, as scribed by Farnki Rodríguez, in my presence, and it is both accurate and complete.          Clinical Impression       ICD-10-CM ICD-9-CM   1. External hemorrhoid, thrombosed K64.5 455.4   2. Acute hemorrhoid K64.9 455.6   3. Anal fissure K60.2 565.0       Disposition:   Disposition: Placed in Observation  Condition: Stable         Karen Blanca MD  08/09/17 8962

## 2017-08-10 ENCOUNTER — PATIENT MESSAGE (OUTPATIENT)
Dept: SURGERY | Facility: CLINIC | Age: 42
End: 2017-08-10

## 2017-08-11 ENCOUNTER — TELEPHONE (OUTPATIENT)
Dept: SURGERY | Facility: CLINIC | Age: 42
End: 2017-08-11

## 2017-08-11 RX ORDER — OXYCODONE AND ACETAMINOPHEN 10; 325 MG/1; MG/1
1 TABLET ORAL EVERY 4 HOURS PRN
Qty: 30 TABLET | Refills: 0 | Status: CANCELLED | OUTPATIENT
Start: 2017-08-11

## 2017-08-11 RX ORDER — HYDROXYZINE PAMOATE 25 MG/1
25 CAPSULE ORAL EVERY 6 HOURS PRN
Qty: 30 CAPSULE | Refills: 0 | Status: SHIPPED | OUTPATIENT
Start: 2017-08-11 | End: 2017-11-29

## 2017-08-11 NOTE — TELEPHONE ENCOUNTER
S/W pt via phone in regards to pain medication, informed pt a msg was sent to Dr. Bolton in regards to this matter, and if pain does not go away or get worse go to the ER. Patient voiced understanding

## 2017-08-13 ENCOUNTER — PATIENT MESSAGE (OUTPATIENT)
Dept: SURGERY | Facility: CLINIC | Age: 42
End: 2017-08-13

## 2017-08-14 ENCOUNTER — PATIENT MESSAGE (OUTPATIENT)
Dept: SURGERY | Facility: CLINIC | Age: 42
End: 2017-08-14

## 2017-08-14 ENCOUNTER — TELEPHONE (OUTPATIENT)
Dept: SURGERY | Facility: CLINIC | Age: 42
End: 2017-08-14

## 2017-08-14 NOTE — TELEPHONE ENCOUNTER
----- Message from Baljeet Bolton MD sent at 8/11/2017  6:42 PM CDT -----  The medication was sent in as ordered. She already had 30 tablets of Percocet prescribed by Dr. Echeverria on 8/9/17. I ordered in Demerol since she stated that she is allergic to it. I do not want her take apply any anal cream, period!!!! Sitz bath as frequently possible and warmer the better.    Baljeet Bolton    ----- Message -----  From: Afia De Jesus LPN  Sent: 8/11/2017   4:44 PM  To: MD Ambrose Souza, Mariah Mancia   Female, 42 y.o., 1975  Weight:   81.6 kg (180 lb)  Phone:   [phone]  PCP:   Abbie Portillo MD  Language:   English  Need Interp:   No  Allergies:   Metoclopramide, Phenergan [Promethazine], Cortisone, Percocet [Oxycodone-acetaminophen]  Health Maintenance:   Due  Primary Ins.:   MEDICAID  MRN:   [id number]  Pt Comm Pref:   None  Patient Portal:   Active  Next Appt:   08/29/2017  My Sticky Note:    RE: Non-Urgent Medical  Mariah Boggs  Sent: Fri August 11, 2017  3:46 PM  To: ALLEN HANCOCK Staff  Non-Urgent Medical     Mariah HANCOCK. JESSE Bhardwaj 56 minutes ago (3:46 PM)       Hello  Pt called, stated, Nyaana was supposed to send prescription to the pharmacy for me, I am in a lot of pain can't sleep, and don't want to go the ER, New Rx. 1. Diltiazem HCL 2%Cream, 2. Percocet  MG, 3. Lidocaine -Prilocaine,(EMLA) Cream. 4. Demerol 50 MG 5. Zofran, and please send to [facility]. [place]    Please Advise  Afia    Please call me if theres some issue and you cant get my meds called in [phone] carmen been waitong all day and im getting really nervous, were going on 4p.m. and its friday if we dont get this worked out soon ill be quite screwed. And i really dont understand why this has taken all day, i was under the impression yesterday that if 1.5norcos didnt work youd just call in the other meds no problem.  All i want is some pain relief and to be able to get some  ozzy German LPN routed conversation to Nayana Bhardwaj PA-C 2 hours ago (2:10 PM)    Mariah Bhardwaj PA-C 2 hours ago (2:07 PM)       I just called and they have the vistiril from you just now thats it and the only rx i was given after surgery was norcos...     So if you would please call in the percocet id really appreciate it. I do not understand why i was not given the rxs that i can see he ordered as well as told me he did after surgery which were norcos or pecocets , 2 creams and demerol. Does that make any sense to you why my meds werent sent in as he told me they would be and as they show on my chart. i recieved neither of the creams or demerol pain meds as shown. Id really just like to have my meds like i was supposed to from the beginning so maybe i could get a little bit of rest, im so tired i just lay in a ball crying from the pain because i cant even sleep. Please just let me know when theyre called in and if i could get some zofran for the nausea that would really help but if not i understand.     Thank you and im sorry if im being a pain im really trying not to be       JESSE Polanco 2 hours ago (1:46 PM)       I would call and check with the pharmacy before you drive over.       Mariah Bhardwaj PA-C 2 hours ago (1:46 PM)       Sorry one more question but there was apparently some confusion over my rxs before i left the hospital the dr had said i was getting percocet, demerol and a cream. And then by time they were getting me ready to be discharged there was some alvino of confusion and the dr was in surgery, so I ended up with the norcos but it even shows on here that dr barton prescribed 3 medications for me being percocet or norco, demerol and a cream so why was i not given what he ordered, was there a mistake made somewhere along the way?       JESSE Polanco 2 hours ago (1:45 PM)       It  margarita like they already have a percocet prescription for you at the pharmacy. I went ahead and sent in the Vistaril too.       Estela German LPN routed conversation to Nayana Bhardwaj PA-C 3 hours ago (1:26 PM)    Mariah Bhardwaj PA-C 3 hours ago (1:05 PM)       Could you please try to let me know something soon if your going to be able to [facility] after my [facility] or if im going to have to come get them bc i will need to find a ride. The only sleep i e gotten was 2 hours last nights sitting on the toilet, so im not comfortable driving more than a couple blocks if that.     And, Is it normal to be passing so much gas and to be having multiple small bowel movements? Espically when im standing or walking im scared because i feel like i have no control over whats gonna come out down there, like i could easily have an accident. I guess it just takes some time to get full muscle control back in your bottom?       Estela German LPN routed conversation to Nayana Bhardwaj PA-C 8 hours ago (8:30 AM)    Mariah Bhardwaj PA-C 9 hours ago (7:30 AM)       Can you send them into the pharmacy like was done after my surgery if at all possible please because im truly exhausted and dont feel i should be driving anymore than absolutely necessary today. Thank you so much       Leslie Sanderson MA routed conversation to Nayana Bhardwaj PA-C 23 hours ago (5:05 PM)    Mariah Bhardwaj PA-C 23 hours ago (4:56 PM)       If im being honest carmen already tried that taking 1@1/2  and it only made me more nauseated, i probably wouldve tried 2 of them but i knew it would deff make me throw up and wasnt worth it. And that reminds me what i forgot earlier the other thing if possible could i get some zofran too please.       JESSE Polanco Yesterday (4:31 PM)       Try taking 1.5 of the pills at a time. Also try taking ibuprofen in between  doses. Let me know if you get any relief with that. If not we can do Percocet and vistaril!       Estela German LPN routed conversation to Nayana Bhardwaj PA-C Yesterday (3:13 PM)    Mariah Bhardwaj PA-C Yesterday (3:05 PM)       I wanted to see if theres anything else that can be done for pain? I am following all home care instructions but im passibg gas non stop and gas ex is no help at all, i have had 3 small bowel movements and am taking sitz baths as instructed the pain pills, creams etc arent helping and i havent slept. One of the nurses yesterday said the dr was originally going to give me 10 mg percocet that they were stronger than norcos but i dont take percocets becauase they cause me to itch terribly. The only time carmen taken them without itching in the past i had to take vistaril i think is what it was called with them to stop the itching.  I dont know but if there is something other than these norcos that i can try id be truly grateful because im exhausted and this is a quite excruciating pain. Please let me know my number is [phone] and [facility] [address] thank you       Responsible Party     Pool - Reda Nayana N Staff  No one has taken responsibility for this message.

## 2017-08-14 NOTE — TELEPHONE ENCOUNTER
----- Message from Brent Huston sent at 8/14/2017  3:37 PM CDT -----  Contact: pt  She's calling in regards to her procedure and medications, please advise, [phone] (home)

## 2017-08-14 NOTE — TELEPHONE ENCOUNTER
"Called the number provided in regards to pain medication, no ans left msg via v/m pain medication was sent to the pharmacy, 2. Do not apply any anal cream period, 3. Sitz bath frequently and the warmer the better, per Dr. Bolton. Will cont to contact pt  Patient called back stated," she will call the pharmacy to check on the status of her pain medication. Encouraged her to call with any other questions/concerns. Voiced understanding  "

## 2017-08-17 ENCOUNTER — PATIENT MESSAGE (OUTPATIENT)
Dept: SURGERY | Facility: CLINIC | Age: 42
End: 2017-08-17

## 2017-08-28 ENCOUNTER — PATIENT MESSAGE (OUTPATIENT)
Dept: SURGERY | Facility: CLINIC | Age: 42
End: 2017-08-28

## 2017-08-30 ENCOUNTER — OFFICE VISIT (OUTPATIENT)
Dept: SURGERY | Facility: CLINIC | Age: 42
End: 2017-08-30
Payer: MEDICAID

## 2017-08-30 VITALS
BODY MASS INDEX: 27.41 KG/M2 | SYSTOLIC BLOOD PRESSURE: 117 MMHG | HEART RATE: 111 BPM | WEIGHT: 185.63 LBS | TEMPERATURE: 99 F | DIASTOLIC BLOOD PRESSURE: 86 MMHG

## 2017-08-30 DIAGNOSIS — K64.5 EXTERNAL HEMORRHOID, THROMBOSED: Primary | ICD-10-CM

## 2017-08-30 DIAGNOSIS — Z98.890 POST-OPERATIVE STATE: ICD-10-CM

## 2017-08-30 PROCEDURE — 99024 POSTOP FOLLOW-UP VISIT: CPT | Mod: ,,, | Performed by: PHYSICIAN ASSISTANT

## 2017-08-30 PROCEDURE — 99999 PR PBB SHADOW E&M-EST. PATIENT-LVL III: CPT | Mod: PBBFAC,,, | Performed by: PHYSICIAN ASSISTANT

## 2017-08-30 PROCEDURE — 99213 OFFICE O/P EST LOW 20 MIN: CPT | Mod: PBBFAC,PO | Performed by: PHYSICIAN ASSISTANT

## 2017-08-30 NOTE — PROGRESS NOTES
Mariah Boggs is status post hemorrhoidectomy and presents today for follow-up care. She complains of moderate pain with sitting but says pain is gradually improving.     PE: no bleeding per rectum. Normal anal sphincter tone. Wounds are healing well.     A/P:  Normal post-operative course.    Return to clinic as needed.

## 2017-09-05 ENCOUNTER — TELEPHONE (OUTPATIENT)
Dept: INTERNAL MEDICINE | Facility: CLINIC | Age: 42
End: 2017-09-05

## 2017-09-05 NOTE — TELEPHONE ENCOUNTER
----- Message from Daya Demarco sent at 9/5/2017 11:12 AM CDT -----  This is an established medicaid patient.  She has a sinus infection and also, she broke a tooth and cannot get into the dentist until next week.  She wants to know if you could work her in today.  Call her at [phone].                                                           mena

## 2017-10-22 ENCOUNTER — PATIENT MESSAGE (OUTPATIENT)
Dept: RHEUMATOLOGY | Facility: CLINIC | Age: 42
End: 2017-10-22

## 2017-10-23 DIAGNOSIS — M79.18 MYOFASCIAL PAIN: ICD-10-CM

## 2017-10-23 DIAGNOSIS — I73.00 RAYNAUD'S PHENOMENON WITHOUT GANGRENE: ICD-10-CM

## 2017-10-23 DIAGNOSIS — M79.7 FIBROMYALGIA: ICD-10-CM

## 2017-10-23 RX ORDER — TIZANIDINE 4 MG/1
4-8 TABLET ORAL 3 TIMES DAILY PRN
Qty: 180 TABLET | Refills: 11 | Status: SHIPPED | OUTPATIENT
Start: 2017-10-23 | End: 2017-10-23 | Stop reason: SDUPTHER

## 2017-10-23 RX ORDER — HYDROXYCHLOROQUINE SULFATE 200 MG/1
200 TABLET, FILM COATED ORAL DAILY
Qty: 30 TABLET | Refills: 0 | Status: SHIPPED | OUTPATIENT
Start: 2017-10-23 | End: 2018-05-28 | Stop reason: SDUPTHER

## 2017-10-23 RX ORDER — DICLOFENAC SODIUM AND MISOPROSTOL 50; 200 MG/1; UG/1
1 TABLET, DELAYED RELEASE ORAL 2 TIMES DAILY
Qty: 60 TABLET | Refills: 5 | Status: SHIPPED | OUTPATIENT
Start: 2017-10-23 | End: 2017-10-30

## 2017-10-23 RX ORDER — TIZANIDINE 4 MG/1
4-8 TABLET ORAL 3 TIMES DAILY PRN
Qty: 180 TABLET | Refills: 11 | Status: SHIPPED | OUTPATIENT
Start: 2017-10-23 | End: 2018-06-01

## 2017-10-25 DIAGNOSIS — I73.00 RAYNAUD'S PHENOMENON WITHOUT GANGRENE: ICD-10-CM

## 2017-10-25 DIAGNOSIS — M25.50 MULTIPLE JOINT PAIN: ICD-10-CM

## 2017-10-30 ENCOUNTER — TELEPHONE (OUTPATIENT)
Dept: RHEUMATOLOGY | Facility: CLINIC | Age: 42
End: 2017-10-30

## 2017-10-30 RX ORDER — HYDROXYCHLOROQUINE SULFATE 200 MG/1
200 TABLET, FILM COATED ORAL DAILY
Qty: 30 TABLET | Refills: 0 | Status: CANCELLED | OUTPATIENT
Start: 2017-10-30

## 2017-10-30 RX ORDER — DICLOFENAC SODIUM 10 MG/G
2 GEL TOPICAL 4 TIMES DAILY
Qty: 300 G | Refills: 3 | Status: CANCELLED | OUTPATIENT
Start: 2017-10-30

## 2017-10-30 RX ORDER — DICLOFENAC SODIUM 50 MG/1
50 TABLET, DELAYED RELEASE ORAL 2 TIMES DAILY
Qty: 60 TABLET | Refills: 5 | Status: SHIPPED | OUTPATIENT
Start: 2017-10-30 | End: 2018-05-29 | Stop reason: SDUPTHER

## 2017-11-22 ENCOUNTER — LAB VISIT (OUTPATIENT)
Dept: LAB | Facility: HOSPITAL | Age: 42
End: 2017-11-22
Attending: INTERNAL MEDICINE
Payer: MEDICAID

## 2017-11-22 DIAGNOSIS — M25.50 MULTIPLE JOINT PAIN: ICD-10-CM

## 2017-11-22 DIAGNOSIS — I73.00 RAYNAUD'S PHENOMENON WITHOUT GANGRENE: ICD-10-CM

## 2017-11-22 DIAGNOSIS — M62.81 MUSCLE WEAKNESS (GENERALIZED): ICD-10-CM

## 2017-11-22 LAB
BILIRUB UR QL STRIP: ABNORMAL
CLARITY UR: ABNORMAL
COLOR UR: YELLOW
GLUCOSE UR QL STRIP: NEGATIVE
HGB UR QL STRIP: NEGATIVE
KETONES UR QL STRIP: NEGATIVE
LEUKOCYTE ESTERASE UR QL STRIP: NEGATIVE
NITRITE UR QL STRIP: NEGATIVE
PH UR STRIP: 6 [PH] (ref 5–8)
PROT UR QL STRIP: ABNORMAL
SP GR UR STRIP: 1.03 (ref 1–1.03)
URN SPEC COLLECT METH UR: ABNORMAL

## 2017-11-22 PROCEDURE — 81002 URINALYSIS NONAUTO W/O SCOPE: CPT

## 2017-11-29 ENCOUNTER — HOSPITAL ENCOUNTER (OUTPATIENT)
Dept: RADIOLOGY | Facility: HOSPITAL | Age: 42
Discharge: HOME | End: 2017-11-29
Attending: INTERNAL MEDICINE
Payer: MEDICAID

## 2017-11-29 ENCOUNTER — OFFICE VISIT (OUTPATIENT)
Dept: RHEUMATOLOGY | Facility: CLINIC | Age: 42
End: 2017-11-29
Payer: MEDICAID

## 2017-11-29 VITALS
HEART RATE: 93 BPM | WEIGHT: 189.63 LBS | SYSTOLIC BLOOD PRESSURE: 150 MMHG | DIASTOLIC BLOOD PRESSURE: 84 MMHG | HEIGHT: 69 IN | BODY MASS INDEX: 28.08 KG/M2

## 2017-11-29 DIAGNOSIS — G43.519 MIGRAINE AURA, PERSISTENT, INTRACTABLE: ICD-10-CM

## 2017-11-29 DIAGNOSIS — M25.50 MULTIPLE JOINT PAIN: Primary | ICD-10-CM

## 2017-11-29 DIAGNOSIS — M54.50 CHRONIC BILATERAL LOW BACK PAIN WITHOUT SCIATICA: ICD-10-CM

## 2017-11-29 DIAGNOSIS — J43.9 MIXED RESTRICTIVE AND OBSTRUCTIVE LUNG DISEASE: Chronic | ICD-10-CM

## 2017-11-29 DIAGNOSIS — Z15.89 HLA B27 (HLA B27 POSITIVE): Chronic | ICD-10-CM

## 2017-11-29 DIAGNOSIS — R25.1 TREMOR: ICD-10-CM

## 2017-11-29 DIAGNOSIS — G89.29 CHRONIC BILATERAL LOW BACK PAIN WITHOUT SCIATICA: ICD-10-CM

## 2017-11-29 DIAGNOSIS — J98.4 MIXED RESTRICTIVE AND OBSTRUCTIVE LUNG DISEASE: Chronic | ICD-10-CM

## 2017-11-29 DIAGNOSIS — R21 FACIAL RASH: ICD-10-CM

## 2017-11-29 DIAGNOSIS — F17.200 SMOKER: Chronic | ICD-10-CM

## 2017-11-29 PROCEDURE — 72100 X-RAY EXAM L-S SPINE 2/3 VWS: CPT | Mod: 26,,, | Performed by: RADIOLOGY

## 2017-11-29 PROCEDURE — 99214 OFFICE O/P EST MOD 30 MIN: CPT | Mod: PBBFAC,25,PO | Performed by: INTERNAL MEDICINE

## 2017-11-29 PROCEDURE — 72100 X-RAY EXAM L-S SPINE 2/3 VWS: CPT | Mod: TC,PO

## 2017-11-29 PROCEDURE — 99999 PR PBB SHADOW E&M-EST. PATIENT-LVL IV: CPT | Mod: PBBFAC,,, | Performed by: INTERNAL MEDICINE

## 2017-11-29 PROCEDURE — 72200 X-RAY EXAM SI JOINTS: CPT | Mod: 26,,, | Performed by: RADIOLOGY

## 2017-11-29 PROCEDURE — 72200 X-RAY EXAM SI JOINTS: CPT | Mod: TC,PO

## 2017-11-29 PROCEDURE — 99214 OFFICE O/P EST MOD 30 MIN: CPT | Mod: S$PBB,,, | Performed by: INTERNAL MEDICINE

## 2017-11-29 RX ORDER — PANTOPRAZOLE SODIUM 20 MG/1
TABLET, DELAYED RELEASE ORAL
COMMUNITY
Start: 2017-11-20 | End: 2018-06-01 | Stop reason: SDUPTHER

## 2017-11-29 RX ORDER — LIDOCAINE AND PRILOCAINE 25; 25 MG/G; MG/G
CREAM TOPICAL
Refills: 0 | COMMUNITY
Start: 2017-08-31

## 2017-11-29 NOTE — PATIENT INSTRUCTIONS
Stay active    Stay on present meds- send us compd cream rx if you run out to refill    See Neuro and derm

## 2017-11-29 NOTE — PROGRESS NOTES
CHIEF COMPLAINT:  Stiffness all over, 7/10.      PERTINENT HISTORY:  Mariah was seen last Joi back in March.  She has a history   of fibromyalgia and Raynaud's.  At one point in time, she was felt to possibly   have lupus or undifferentiated connective disease.  She does have Raynaud's as   well as arthralgias.  She was placed on Plaquenil in the past and is now taking   one daily.  She thinks this has helped her.  Her Raynaud's has been stable   though it does get worse if she gets cool.  No ulcers on the tips of the   fingers.  She has having chronic back pain and has B27 positive.  Her x-rays in   the past suggested possible sclerosis, but an MRI done was normal.  She had not   had these checked since 2015.  She still has back pain.     She has had some anxiety and depression problems in the past.  She is off   Lexapro now though.  She has had some unusual neurologic abnormalities with   migraines, seizure-like activity, blackouts, and tremors.  She has been seeing a   private neurologist.  She is started on Topamax 200 b.i.d. and it helped a lot.    He is retiring and she needs to see somebody else.  She has blacked out in the   past.    She is seeing Dr. Ordoñez for chronic pain.  She has had steroid injections in   the past, but they tend to give her bad reaction.  She does not want those any   longer.  She does get occasional rashes when she gets overheated.  She still has   left over some dark spots on her face that she has not ever treated and seen   Derm.      REVIEW OF SYSTEMS:  CONSTITUTIONAL:  Her weight is going up.  No recurring fevers, chills, sweats or   signs of infection.  HEENT:  Note the rash on her face.   CARDIAC:  She has had no history of hypertension or heart attacks.  LUNGS:  She has chronic bronchitis.  She coughs almost daily.  Unfortunately,   she is smoking.  She has been called a mixed picture in the past.    ABDOMEN:  GI tract no recent increase in symptoms.    Vaxcular- Occ.   Raynaud's though not   producing any ulcers to this point.    UROLOGIC:  She has had renal colic in the past with hydronephrosis.    HEMATOLOGIC:  She has been anemic in the past, though she is not anemic at this   time.    NODES AND JOINT:  See HPI, history of fibromyalgia probably.  HLA-B27 was   positive.  Does have back pain, but the x-rays and MRI have not shown classic   changes there.      PHYSICAL EXAMINATION:  VITAL SIGNS:  With her weight 86 kilograms, BMI is 28, height 5 feet 9 inches,   with blood pressure 150/84, slightly hypertensive, heart rates in the 90s, pain   score 7.  EXTREMITIES:  Exam of her hands shows full range of motion occurring at her PIP   joints and MP joints.  I do not see any marked redness or heat in these joints.    She still has a reasonably good forward flexion.  DIPs are clear.  Her wrist   mobility is normal.  The skin of her hands does not show any signs of   scleroderma.  The palms look fine although with palmar erythema.  Fingertips do   not show any loss of pulp from Raynaud's.  There is one little red spot on the   left second digit, pin point.  Not particularly painful today.  Does see   calcinosis.  Her skin of her forearms is clear.  No signs of scleroderma.  Her   elbows and shoulders move well, but she has tenderness in those areas.  She is   tender up in the upper back and then in the neck.  Neck rotation is still good.    Chest expansion is about 2 inches.  Low back, she has forward flexion within a   couple inches.  Forward extension is good.  Schober test is normal.  SI joints   and lower back are little tender.  Hips move well.  Her knees do move well.  No   significant edema.  Muscle strength still well maintained.    SKIN:  Peripherally I do not see any signs of rashes, but her face she does have   a melasma type rash occurring with hyperpigmentation.  Her ears have no discoid   lesions.      LABORATORY DATA:  Especially CBC with white count 12,000, hemoglobin  14,   platelets 330,000.  Sed rate normal.  Chemistries, potassium slightly decreased   3.4, otherwise electrolytes normal.  Creatinine and glucose normal.  LFTs   normal.  CRP 4.6.  The last couple of ANAs have been negative with normal   complements.  Myomarker panel was negative.  Urinalysis with trace protein, no   active sediment.    IMPRESSIONS:  1. Fibromyalgia by history with generalized muscles soreness and aching without   signs of synovitis, multiple trigger points.  She does have several associated   conditions with FM- migraines, fatigue, and irritable bowel.    Fibromyalgia -still seems active.    2. Raynaud's syndrome stable with negative SATNAM now.  Monitor for connective   tissue disease.  No signs of lupus at this time.   3.  Facial, rash which looks like melasma.  Send to Derm for an evaluation   and treatment.  4.  HLA-B27 positive with back pain.  Does not have a classic inflammatory back   pain.  Symptoms and x-rays in the past have been okay.  We will recheck.    5.  Anxiety and depression by history with various neurologic syndromes.    6.  Chronic bronchitis-likely related to smoking.  Chronic smoking is   appreciated.    PLAN:  1.  I encouraged her to quit smoking completely.  2.  Get her to neurology and see what they think about the Topamax, and what all   thoughts they would have on her various neurologic conditions.  3.  Get her to Derm to see what they think about something to decrease the   pigment rash on her face.  If they think there is any relation to connective   tissue disease.  4.  Would maintain her present fibro regimen including gabapentin and Voltaren   topical and tablets 50 mg twice daily.  Consideration by psychiatry in the   future if they want to try back something like Cymbalta.  Maintain Plaquenil as   it seems to have helped her and just one daily is safe.  5.  Make sure she gets her eyes checked.  Encouraged her to stay active for   findings in regards to  fibromyalgia.  6.  See back in six months with Joi with routine lab and we will check her   SATNAM with that.  We will check her x-rays of SI joints and back today to see   anything has changed since 2015.          RAIMUNDO  dd: 11/29/2017 09:22:11 (CST)  td: 11/30/2017 00:13:38 (CST)  Doc ID   #[id number]  Job ID #[id number]    CC:

## 2018-01-17 ENCOUNTER — PATIENT OUTREACH (OUTPATIENT)
Dept: ADMINISTRATIVE | Facility: HOSPITAL | Age: 43
End: 2018-01-17

## 2018-01-17 DIAGNOSIS — Z12.31 ENCOUNTER FOR SCREENING MAMMOGRAM FOR BREAST CANCER: Primary | ICD-10-CM

## 2018-01-17 NOTE — PROGRESS NOTES
Called patient for mammogram scheduling. I will call patient with date and time. Patient in agreement and vocalize understanding.

## 2018-01-23 ENCOUNTER — PATIENT OUTREACH (OUTPATIENT)
Dept: ADMINISTRATIVE | Facility: HOSPITAL | Age: 43
End: 2018-01-23

## 2018-01-23 NOTE — PROGRESS NOTES
I have attempted without success to contact this patient by phone to schedule annual mammogram exam. Patient not available, left voicemail to call me back.

## 2018-01-23 NOTE — PROGRESS NOTES
I have attempted without success to contact this patient by phone to schedule a depression follow up. Patient not available, left voicemail.

## 2018-05-28 DIAGNOSIS — I73.00 RAYNAUD'S PHENOMENON WITHOUT GANGRENE: ICD-10-CM

## 2018-05-28 RX ORDER — HYDROXYCHLOROQUINE SULFATE 200 MG/1
TABLET, FILM COATED ORAL
Qty: 30 TABLET | Refills: 0 | Status: SHIPPED | OUTPATIENT
Start: 2018-05-28 | End: 2018-06-01 | Stop reason: SDUPTHER

## 2018-05-29 RX ORDER — DICLOFENAC SODIUM 50 MG/1
50 TABLET, DELAYED RELEASE ORAL 2 TIMES DAILY
Qty: 60 TABLET | Refills: 0 | Status: SHIPPED | OUTPATIENT
Start: 2018-05-29 | End: 2018-06-01 | Stop reason: SDUPTHER

## 2018-05-30 NOTE — PROGRESS NOTES
"Subjective:       Patient ID: Mariah Boggs is a 43 y.o. female.    Chief Complaint: Fibromyalgia; raynauds; and HLA B 27    Mariah is here for follow up. She has a history of fibromyalgia, +hla-b27, and raynauds.  She says she has lupus as well but this has not been documented.  She reports a history of a positive NUBIA at another facility.  Repeat nubia done here have been negative. She was placed on plaquenil 200mg daily she says for her lupus.  She feels like the plaquenil has helped her overall, reports frequent "flares" when off her plaquenil.     She has a history of raynauds and this is stable. She keeps her hands warm. No digital ulcers.      She has chronic widespread pain throughout her body and c/o joint pain, no swelling.  Pain in her back, hips,  knees and neck.  She also c/o burning to both her feet.  She is taking gabapentin 600mg QID.  Has taken lyrica and lexapro in the past but didn't do well on these.  She takes voltaren for joint pain twice daily. zanaflex but says this isn't helping anymore. Has a topical compound cream which she says doesn't help anymore.  She c/o depression and anxiety.      Regarding her +HLA-B27, she does complain of low back pain that has been consistent with mechanical back pain.  She has an xray of her SI joints in the past that did note sclerosis at the iliac portion of the si joints.  MRI was done and was normal. Repeat L spine and SI xrays done 11/2017 shows degenerative changes, no sclerosis.        Review of Systems   Constitutional: Positive for fatigue. Negative for chills and fever.   HENT: Negative for mouth sores, rhinorrhea and sore throat.    Eyes: Negative for pain and redness.   Respiratory: Negative for cough, shortness of breath and wheezing.    Cardiovascular: Negative for chest pain.   Gastrointestinal: Negative for abdominal pain, constipation, diarrhea, nausea and vomiting.   Genitourinary: Negative for dysuria and hematuria.        Reported h/o " "kidney stones   Musculoskeletal: Positive for arthralgias, back pain, myalgias and neck pain. Negative for joint swelling.   Skin: Negative for rash.        raynauds   Neurological: Negative for weakness, numbness and headaches.        H/o frequent dizzy spells   Hematological: Negative for adenopathy.   Psychiatric/Behavioral: The patient is not nervous/anxious.         Flat affect         Objective:   /83   Pulse 93   Ht 5' 9" (1.753 m)   Wt 78.9 kg (173 lb 15.1 oz)   LMP  (LMP Unknown)   BMI 25.69 kg/m²      Physical Exam   Constitutional: She is oriented to person, place, and time and well-developed, well-nourished, and in no distress.   HENT:   Head: Normocephalic and atraumatic.   Eyes: Pupils are equal, round, and reactive to light. Right eye exhibits no discharge.   Neck: Normal range of motion.   Cardiovascular: Normal rate, regular rhythm and normal heart sounds.  Exam reveals no friction rub.    Pulmonary/Chest: Effort normal and breath sounds normal. No respiratory distress.   Cough with deep breaths   Abdominal: Soft. She exhibits no distension. There is no tenderness.   Lymphadenopathy:     She has no cervical adenopathy.   Neurological: She is alert and oriented to person, place, and time.   Skin: No rash noted. No erythema.     No digital ulcers  Does have scattered light brown sun spots on face and forehead   Psychiatric: Mood normal.   Musculoskeletal: Normal range of motion. She exhibits tenderness. She exhibits no edema or deformity.   No synovitis or joint effusions noted  Tender myofascial points throughout shoulders and neck, elbows, hips and knees--very tender to light touch +jump sign  Good chest expansion,   Forward flexion about 5-6 inches from the floor, good lumbar rotation  Limited cervical rotation           No results found for this or any previous visit (from the past 168 hour(s)).  xr SI joints 2014:   Mild sclerosis of the iliac portion of the SI joints, left slightly " greater than right.  XR Lspine 2014: normal  MRI pelvis 11/2015:   Marrow signal.the visualized osseous structures is within normal limits.  No abnormal marrow edema, evidence of fracture, or marrow replacement process.    Bilateral SI joints appear unremarkable with no appreciable erosive changes.    Visualized sacral nerve roots appear unremarkable.  Impression: no abnormal findings.         Assessment:       1. Multiple joint pain    2. Raynaud's phenomenon without gangrene    3. Fibromyalgia    4. Chronic bilateral low back pain without sciatica    5. Depression, unspecified depression type    6. Anxiety    7. Medication monitoring encounter        Impression:    1. Fibromyalgia: MAIN ISSUE, gabapentin 600mg QID, failed lyrica, lexapro 10mg/day for depression per pcp (stopped taking), continues with widespread pain; zanaflex 4 mg tid per dr. maldonado    2. Raynauds history: stable     3. 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 Chronic low back pain: worse with activity, c/o is more consistent with mechanical back pain, voltaren bid    5. Multiple joint pain: taking voltaren bid, c/o back hip and knee pain    6. Patient reported history of lupus: history of +SATNAM outside facility, was started on plaquenil once daily unsure if this was for her back or SATNAM, review of notes suggest was started due to SI sclerosis and back pain, does not fit criteria for SLE    7. Medication monitoring: plaquenil once daily, utd with eye checks    8. Anxiety and depression, stopped taking her lexapro    Plan:       Would recommend seeing pain mgt if she can find one to take her insurance as chronic widespread pain is her main issue  Stop zanaflex, try flexeril 1-2 daily as needed  Refilled her plaquenil, and voltaren,  She is finding a new pcp as hers just retired, so needed refills  on the following, will give one month supply only and all future refills need to come from pcp: Protonix, pepcid, albuterol, topamax   Sent new different compound topical to pharmacy to see if works any diff  Referral to psych for anxiety and depression  Discussed decreasing and stopping plaquenil use as there isn't any sign of CTD at this time, she became very anxious and opposed to this idea, states the plaquenil helps her and she has done better since taking, will continue with once daily for now, but I prefer to dc this in the future    rtc in 6 mos reg 4 labs

## 2018-06-01 ENCOUNTER — OFFICE VISIT (OUTPATIENT)
Dept: RHEUMATOLOGY | Facility: CLINIC | Age: 43
End: 2018-06-01
Payer: MEDICAID

## 2018-06-01 VITALS
HEART RATE: 93 BPM | WEIGHT: 173.94 LBS | DIASTOLIC BLOOD PRESSURE: 83 MMHG | HEIGHT: 69 IN | SYSTOLIC BLOOD PRESSURE: 119 MMHG | BODY MASS INDEX: 25.76 KG/M2

## 2018-06-01 DIAGNOSIS — F32.A DEPRESSION, UNSPECIFIED DEPRESSION TYPE: ICD-10-CM

## 2018-06-01 DIAGNOSIS — F41.9 ANXIETY: ICD-10-CM

## 2018-06-01 DIAGNOSIS — M54.50 CHRONIC BILATERAL LOW BACK PAIN WITHOUT SCIATICA: ICD-10-CM

## 2018-06-01 DIAGNOSIS — Z51.81 MEDICATION MONITORING ENCOUNTER: ICD-10-CM

## 2018-06-01 DIAGNOSIS — M25.50 MULTIPLE JOINT PAIN: Primary | ICD-10-CM

## 2018-06-01 DIAGNOSIS — G89.29 CHRONIC BILATERAL LOW BACK PAIN WITHOUT SCIATICA: ICD-10-CM

## 2018-06-01 DIAGNOSIS — I73.00 RAYNAUD'S PHENOMENON WITHOUT GANGRENE: ICD-10-CM

## 2018-06-01 DIAGNOSIS — M79.7 FIBROMYALGIA: ICD-10-CM

## 2018-06-01 PROCEDURE — 99213 OFFICE O/P EST LOW 20 MIN: CPT | Mod: PBBFAC,PO | Performed by: PHYSICIAN ASSISTANT

## 2018-06-01 PROCEDURE — 99214 OFFICE O/P EST MOD 30 MIN: CPT | Mod: S$PBB,,, | Performed by: PHYSICIAN ASSISTANT

## 2018-06-01 PROCEDURE — 99999 PR PBB SHADOW E&M-EST. PATIENT-LVL III: CPT | Mod: PBBFAC,,, | Performed by: PHYSICIAN ASSISTANT

## 2018-06-01 RX ORDER — PANTOPRAZOLE SODIUM 20 MG/1
20 TABLET, DELAYED RELEASE ORAL DAILY
Qty: 30 TABLET | Refills: 0 | Status: SHIPPED | OUTPATIENT
Start: 2018-06-01

## 2018-06-01 RX ORDER — DICLOFENAC SODIUM 50 MG/1
50 TABLET, DELAYED RELEASE ORAL 2 TIMES DAILY
Qty: 60 TABLET | Refills: 5 | Status: SHIPPED | OUTPATIENT
Start: 2018-06-01 | End: 2018-11-09 | Stop reason: SDUPTHER

## 2018-06-01 RX ORDER — TOPIRAMATE 100 MG/1
200 TABLET, FILM COATED ORAL EVERY 12 HOURS PRN
Qty: 60 TABLET | Refills: 0 | Status: SHIPPED | OUTPATIENT
Start: 2018-06-01

## 2018-06-01 RX ORDER — IPRATROPIUM BROMIDE AND ALBUTEROL SULFATE 2.5; .5 MG/3ML; MG/3ML
SOLUTION RESPIRATORY (INHALATION)
Qty: 360 ML | Refills: 0 | Status: SHIPPED | OUTPATIENT
Start: 2018-06-01

## 2018-06-01 RX ORDER — CYCLOBENZAPRINE HCL 10 MG
10 TABLET ORAL 3 TIMES DAILY PRN
Qty: 90 TABLET | Refills: 5 | Status: SHIPPED | OUTPATIENT
Start: 2018-06-01 | End: 2018-06-11

## 2018-06-01 RX ORDER — ALBUTEROL SULFATE 90 UG/1
AEROSOL, METERED RESPIRATORY (INHALATION)
Qty: 18 G | Refills: 0 | Status: SHIPPED | OUTPATIENT
Start: 2018-06-01

## 2018-06-01 RX ORDER — FAMOTIDINE 40 MG/1
40 TABLET, FILM COATED ORAL DAILY
Qty: 30 TABLET | Refills: 1 | Status: SHIPPED | OUTPATIENT
Start: 2018-06-01

## 2018-06-01 RX ORDER — HYDROXYCHLOROQUINE SULFATE 200 MG/1
200 TABLET, FILM COATED ORAL DAILY
Qty: 30 TABLET | Refills: 5 | Status: SHIPPED | OUTPATIENT
Start: 2018-06-01 | End: 2018-11-09 | Stop reason: SDUPTHER

## 2018-06-01 NOTE — PATIENT INSTRUCTIONS
Change to flexeril from zanaflex    No other change to meds    Refill all meds today but get pcp to fill from now on .

## 2018-07-12 ENCOUNTER — TELEPHONE (OUTPATIENT)
Dept: OBSTETRICS AND GYNECOLOGY | Facility: CLINIC | Age: 43
End: 2018-07-12

## 2018-07-12 DIAGNOSIS — Z78.0 MENOPAUSE: ICD-10-CM

## 2018-07-12 RX ORDER — ESTRADIOL 2 MG/1
TABLET ORAL
Qty: 30 TABLET | Refills: 0 | Status: SHIPPED | OUTPATIENT
Start: 2018-07-12 | End: 2018-08-13 | Stop reason: SDUPTHER

## 2018-07-12 NOTE — TELEPHONE ENCOUNTER
----- Message from Shelly Mcdonald NP sent at 7/12/2018 11:12 AM CDT -----  Patient wants HRT meds refill. I refilled for one month. Needs to see MD for annual visit. I do not feel comfortable giving HRT when she refused pelvic exam and mammogram. Let her see Dr. Carty.

## 2018-07-12 NOTE — TELEPHONE ENCOUNTER
Next available with [facility] is 8/21/18, pt states she only has 3 pills left. I notified pt she is not due for an annual until 7/21. Pt is requesting a refill x 1 month until we can get her scheduled. Please advise.

## 2018-07-13 NOTE — TELEPHONE ENCOUNTER
Called [facility] to confirm that prescription was received. Call pt and made aware that it is ready for . Scheduled for annual on 8/21/18 11:15 AM.

## 2018-08-13 ENCOUNTER — PATIENT MESSAGE (OUTPATIENT)
Dept: OBSTETRICS AND GYNECOLOGY | Facility: CLINIC | Age: 43
End: 2018-08-13

## 2018-08-13 DIAGNOSIS — Z78.0 MENOPAUSE: ICD-10-CM

## 2018-08-13 RX ORDER — ESTRADIOL 2 MG/1
2 TABLET ORAL DAILY
Qty: 30 TABLET | Refills: 0 | Status: SHIPPED | OUTPATIENT
Start: 2018-08-13 | End: 2018-09-11 | Stop reason: SDUPTHER

## 2018-08-20 ENCOUNTER — PATIENT MESSAGE (OUTPATIENT)
Dept: OBSTETRICS AND GYNECOLOGY | Facility: CLINIC | Age: 43
End: 2018-08-20

## 2018-08-22 ENCOUNTER — PATIENT MESSAGE (OUTPATIENT)
Dept: PHYSICAL MEDICINE AND REHAB | Facility: CLINIC | Age: 43
End: 2018-08-22

## 2018-08-22 DIAGNOSIS — M79.7 FIBROMYALGIA: ICD-10-CM

## 2018-08-22 RX ORDER — GABAPENTIN 600 MG/1
TABLET ORAL
Qty: 120 TABLET | Refills: 11 | Status: SHIPPED | OUTPATIENT
Start: 2018-08-22 | End: 2019-07-10 | Stop reason: SDUPTHER

## 2018-09-09 DIAGNOSIS — Z78.0 MENOPAUSE: ICD-10-CM

## 2018-09-10 ENCOUNTER — TELEPHONE (OUTPATIENT)
Dept: PHYSICAL MEDICINE AND REHAB | Facility: CLINIC | Age: 43
End: 2018-09-10

## 2018-09-10 DIAGNOSIS — M79.18 MYOFASCIAL PAIN: ICD-10-CM

## 2018-09-10 RX ORDER — ESTRADIOL 2 MG/1
TABLET ORAL
Qty: 30 TABLET | Refills: 0 | OUTPATIENT
Start: 2018-09-10

## 2018-09-10 RX ORDER — TIZANIDINE 4 MG/1
4-8 TABLET ORAL 3 TIMES DAILY PRN
Qty: 180 TABLET | Refills: 5 | Status: SHIPPED | OUTPATIENT
Start: 2018-09-10 | End: 2018-09-27 | Stop reason: ALTCHOICE

## 2018-09-10 NOTE — TELEPHONE ENCOUNTER
----- Message from Alejandra Cielo sent at 9/10/2018  3:06 PM CDT -----  Pt at [phone]//states she is needing a new prescription for med//Tezanadine???????//uses//[facility] on [address]//the insurance company is  Needing a new script//will not fill med without it//please call//thanks/beatriz

## 2018-09-11 ENCOUNTER — OFFICE VISIT (OUTPATIENT)
Dept: OBSTETRICS AND GYNECOLOGY | Facility: CLINIC | Age: 43
End: 2018-09-11
Payer: MEDICAID

## 2018-09-11 VITALS — BODY MASS INDEX: 28.41 KG/M2 | HEIGHT: 69 IN | WEIGHT: 191.81 LBS

## 2018-09-11 DIAGNOSIS — F43.10: ICD-10-CM

## 2018-09-11 DIAGNOSIS — Z01.419 ENCOUNTER FOR GYNECOLOGICAL EXAMINATION (GENERAL) (ROUTINE) WITHOUT ABNORMAL FINDINGS: Primary | ICD-10-CM

## 2018-09-11 DIAGNOSIS — Z12.4 SCREENING FOR CERVICAL CANCER: ICD-10-CM

## 2018-09-11 DIAGNOSIS — Z78.0 MENOPAUSE: ICD-10-CM

## 2018-09-11 DIAGNOSIS — Z12.31 SCREENING MAMMOGRAM, ENCOUNTER FOR: ICD-10-CM

## 2018-09-11 DIAGNOSIS — F43.10 POST-TRAUMATIC STRESS REACTION: ICD-10-CM

## 2018-09-11 PROCEDURE — 88142 CYTOPATH C/V THIN LAYER: CPT

## 2018-09-11 PROCEDURE — 99396 PREV VISIT EST AGE 40-64: CPT | Mod: S$PBB,,, | Performed by: OBSTETRICS & GYNECOLOGY

## 2018-09-11 PROCEDURE — 99213 OFFICE O/P EST LOW 20 MIN: CPT | Mod: PBBFAC | Performed by: OBSTETRICS & GYNECOLOGY

## 2018-09-11 PROCEDURE — 99999 PR PBB SHADOW E&M-EST. PATIENT-LVL III: CPT | Mod: PBBFAC,,, | Performed by: OBSTETRICS & GYNECOLOGY

## 2018-09-11 RX ORDER — ESTRADIOL 2 MG/1
2 TABLET ORAL DAILY
Qty: 30 TABLET | Refills: 11 | Status: SHIPPED | OUTPATIENT
Start: 2018-09-11 | End: 2019-08-27 | Stop reason: SDUPTHER
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
## 2018-09-27 ENCOUNTER — HOSPITAL ENCOUNTER (EMERGENCY)
Facility: HOSPITAL | Age: 43
Discharge: HOME | End: 2018-09-27
Attending: FAMILY MEDICINE
Payer: MEDICAID

## 2018-09-27 ENCOUNTER — PATIENT MESSAGE (OUTPATIENT)
Dept: PHYSICAL MEDICINE AND REHAB | Facility: CLINIC | Age: 43
End: 2018-09-27

## 2018-09-27 VITALS
BODY MASS INDEX: 28.32 KG/M2 | HEIGHT: 69 IN | OXYGEN SATURATION: 97 % | HEART RATE: 82 BPM | RESPIRATION RATE: 16 BRPM | TEMPERATURE: 98 F | DIASTOLIC BLOOD PRESSURE: 90 MMHG | SYSTOLIC BLOOD PRESSURE: 145 MMHG

## 2018-09-27 DIAGNOSIS — G89.29 CHRONIC NONINTRACTABLE HEADACHE, UNSPECIFIED HEADACHE TYPE: Primary | ICD-10-CM

## 2018-09-27 DIAGNOSIS — R51.9 CHRONIC NONINTRACTABLE HEADACHE, UNSPECIFIED HEADACHE TYPE: Primary | ICD-10-CM

## 2018-09-27 LAB
ALBUMIN SERPL BCP-MCNC: 3.5 G/DL
ALP SERPL-CCNC: 66 U/L
ALT SERPL W/O P-5'-P-CCNC: 42 U/L
AMPHET+METHAMPHET UR QL: NEGATIVE
ANION GAP SERPL CALC-SCNC: 8 MMOL/L
AST SERPL-CCNC: 27 U/L
B-HCG UR QL: NEGATIVE
BARBITURATES UR QL SCN>200 NG/ML: NEGATIVE
BASOPHILS # BLD AUTO: 0.04 K/UL
BASOPHILS NFR BLD: 0.4 %
BENZODIAZ UR QL SCN>200 NG/ML: NEGATIVE
BILIRUB SERPL-MCNC: 0.2 MG/DL
BILIRUB UR QL STRIP: NEGATIVE
BUN SERPL-MCNC: 15 MG/DL
BZE UR QL SCN: NEGATIVE
CALCIUM SERPL-MCNC: 8.8 MG/DL
CANNABINOIDS UR QL SCN: NORMAL
CHLORIDE SERPL-SCNC: 110 MMOL/L
CLARITY UR: CLEAR
CO2 SERPL-SCNC: 23 MMOL/L
COLOR UR: YELLOW
CREAT SERPL-MCNC: 0.8 MG/DL
CREAT UR-MCNC: 35.3 MG/DL
DIFFERENTIAL METHOD: ABNORMAL
EOSINOPHIL # BLD AUTO: 0.1 K/UL
EOSINOPHIL NFR BLD: 0.8 %
ERYTHROCYTE [DISTWIDTH] IN BLOOD BY AUTOMATED COUNT: 12.9 %
EST. GFR  (AFRICAN AMERICAN): >60 ML/MIN/1.73 M^2
EST. GFR  (NON AFRICAN AMERICAN): >60 ML/MIN/1.73 M^2
GLUCOSE SERPL-MCNC: 107 MG/DL
GLUCOSE UR QL STRIP: NEGATIVE
HCT VFR BLD AUTO: 41.1 %
HGB BLD-MCNC: 14.3 G/DL
HGB UR QL STRIP: NEGATIVE
KETONES UR QL STRIP: NEGATIVE
LEUKOCYTE ESTERASE UR QL STRIP: NEGATIVE
LYMPHOCYTES # BLD AUTO: 2.4 K/UL
LYMPHOCYTES NFR BLD: 22.7 %
MCH RBC QN AUTO: 31.3 PG
MCHC RBC AUTO-ENTMCNC: 34.8 G/DL
MCV RBC AUTO: 90 FL
METHADONE UR QL SCN>300 NG/ML: NEGATIVE
MONOCYTES # BLD AUTO: 0.8 K/UL
MONOCYTES NFR BLD: 7.2 %
NEUTROPHILS # BLD AUTO: 7.4 K/UL
NEUTROPHILS NFR BLD: 68.9 %
NITRITE UR QL STRIP: NEGATIVE
OPIATES UR QL SCN: NEGATIVE
PCP UR QL SCN>25 NG/ML: NEGATIVE
PH UR STRIP: 8 [PH] (ref 5–8)
PLATELET # BLD AUTO: 303 K/UL
PMV BLD AUTO: 9.7 FL
POTASSIUM SERPL-SCNC: 4.6 MMOL/L
PROT SERPL-MCNC: 6.8 G/DL
PROT UR QL STRIP: NEGATIVE
RBC # BLD AUTO: 4.57 M/UL
SODIUM SERPL-SCNC: 141 MMOL/L
SP GR UR STRIP: 1.01 (ref 1–1.03)
TOXICOLOGY INFORMATION: NORMAL
URN SPEC COLLECT METH UR: NORMAL
UROBILINOGEN UR STRIP-ACNC: NEGATIVE EU/DL
WBC # BLD AUTO: 10.74 K/UL

## 2018-09-27 PROCEDURE — 25000003 PHARM REV CODE 250: Performed by: FAMILY MEDICINE

## 2018-09-27 PROCEDURE — 80053 COMPREHEN METABOLIC PANEL: CPT

## 2018-09-27 PROCEDURE — 85025 COMPLETE CBC W/AUTO DIFF WBC: CPT

## 2018-09-27 PROCEDURE — 96374 THER/PROPH/DIAG INJ IV PUSH: CPT

## 2018-09-27 PROCEDURE — 81003 URINALYSIS AUTO W/O SCOPE: CPT | Mod: 59

## 2018-09-27 PROCEDURE — 99284 EMERGENCY DEPT VISIT MOD MDM: CPT | Mod: 25

## 2018-09-27 PROCEDURE — 81025 URINE PREGNANCY TEST: CPT

## 2018-09-27 PROCEDURE — 96375 TX/PRO/DX INJ NEW DRUG ADDON: CPT

## 2018-09-27 PROCEDURE — 80307 DRUG TEST PRSMV CHEM ANLYZR: CPT

## 2018-09-27 PROCEDURE — 96361 HYDRATE IV INFUSION ADD-ON: CPT

## 2018-09-27 PROCEDURE — 63600175 PHARM REV CODE 636 W HCPCS: Performed by: FAMILY MEDICINE

## 2018-09-27 RX ORDER — PROCHLORPERAZINE EDISYLATE 5 MG/ML
10 INJECTION INTRAMUSCULAR; INTRAVENOUS EVERY 6 HOURS PRN
Status: COMPLETED | OUTPATIENT
Start: 2018-09-27 | End: 2018-09-27

## 2018-09-27 RX ORDER — BACLOFEN 10 MG/1
10-20 TABLET ORAL 3 TIMES DAILY
Qty: 180 TABLET | Refills: 11 | Status: SHIPPED | OUTPATIENT
Start: 2018-09-27 | End: 2019-02-15

## 2018-09-27 RX ORDER — KETOROLAC TROMETHAMINE 30 MG/ML
30 INJECTION, SOLUTION INTRAMUSCULAR; INTRAVENOUS
Status: COMPLETED | OUTPATIENT
Start: 2018-09-27 | End: 2018-09-27

## 2018-09-27 RX ORDER — ONDANSETRON 2 MG/ML
4 INJECTION INTRAMUSCULAR; INTRAVENOUS
Status: COMPLETED | OUTPATIENT
Start: 2018-09-27 | End: 2018-09-27

## 2018-09-27 RX ADMIN — PROCHLORPERAZINE EDISYLATE 10 MG: 5 INJECTION INTRAMUSCULAR; INTRAVENOUS at 08:09

## 2018-09-27 RX ADMIN — ONDANSETRON 4 MG: 2 INJECTION INTRAMUSCULAR; INTRAVENOUS at 07:09

## 2018-09-27 RX ADMIN — SODIUM CHLORIDE 1000 ML: 0.9 INJECTION, SOLUTION INTRAVENOUS at 07:09

## 2018-09-27 RX ADMIN — KETOROLAC TROMETHAMINE 30 MG: 30 INJECTION INTRAMUSCULAR; INTRAVENOUS at 07:09

## 2018-09-27 NOTE — ED NOTES
Pt states that head still hurts but nausea is better. She would like to go home with a Rx for pain but will take something more here she can leave soon. Lights have been dimmed since arrival for pt comfort, call bell w/in reach. Will notify MD of pt request

## 2018-09-27 NOTE — ED PROVIDER NOTES
SCRIBE #1 NOTE: IMarianela, am scribing for, and in the presence of, Viky Yun MD. I have scribed the entire note.      History      Chief Complaint   Patient presents with    Migraine     left temporal migraine since midnight       Review of patient's allergies indicates:   Allergen Reactions    Metoclopramide Hives, Itching and Shortness Of Breath    Phenergan [promethazine] Other (See Comments)     Pt states she can not tolerated phenergen via IV ONLY     Cortisone Nausea And Vomiting        HPI   HPI    9/27/2018, 7:11 AM   History obtained from the patient      History of Present Illness: Mariah Boggs is a 43 y.o. female patient with PMHx of fibromyalgia and migraine HAs who presents to the Emergency Department for L temporal HA which onset gradually around midnight. She reports that she began vomiting an hour later. The pain has been constant, moderate in severity, and consistent with previous HAs. The light worsens the HA. No mitigating factors reported. Patient denies fever, chills, visual disturbances, abd pain, neck pain, dizziness, lightheadedness, extremity weakness/numbness, and all other sxs at this time. No further complaints or concerns at this time.       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)    Tremor 2014       Past Surgical History:  Past Surgical History:   Procedure Laterality Date    ADENOIDECTOMY      CERVICAL BIOPSY  W/ LOOP ELECTRODE EXCISION      CHOLECYSTECTOMY-LAPAROSCOPIC N/A 3/17/2015    [facility] OR    CLAVICLE SURGERY Left     x2    COSMETIC SURGERY      EXAM UNDER ANESTHESIA N/A 2017    Performed by Baljeet Bolton [facility] at [facility] OR    HEMORRHOIDECTOMY N/A 2017    [facility] Baljeet Bolton [facility] at [facility] OR    HYSTERECTOMY      A2    SHOULDER SURGERY Left     TONSILLECTOMY           Family History:  Family History   Problem Relation Age of Onset    Fibromyalgia Mother     Lupus Mother     Mental illness Mother     Asthma Mother     Hypertension Mother     Cancer Maternal Aunt     Asthma Father     Cancer Maternal Grandmother     Emphysema Maternal Grandmother     Diabetes Paternal Grandfather     Cancer Maternal Grandfather        Social History:  Social History     Tobacco Use    Smoking status: Current Every Day Smoker     Packs/day: 0.50     Years: 25.00     Pack years: 12.50    Smokeless tobacco: Current User    Tobacco comment: trying to quit   Substance and Sexual Activity    Alcohol use: No    Drug use: No    Sexual activity: Yes     Partners: Male     Birth control/protection: See Surgical Hx       ROS   Review of Systems   Constitutional: Negative for chills and fever.   HENT: Negative for sore throat.    Eyes: Positive for photophobia. Negative for pain and visual disturbance.   Respiratory: Negative for shortness of breath.    Cardiovascular: Negative for chest pain.   Gastrointestinal: Positive for nausea and vomiting. Negative for abdominal pain.   Genitourinary: Negative for dysuria.   Musculoskeletal: Negative for back pain, neck pain and neck stiffness.   Skin: Negative for rash.   Neurological: Positive for headaches. Negative for dizziness, seizures, syncope,  "speech difficulty, weakness, light-headedness and numbness.   Hematological: Does not bruise/bleed easily.   All other systems reviewed and are negative.      Physical Exam      Initial Vitals [09/27/18 0641]   BP Pulse Resp Temp SpO2   (!) 147/91 97 18 98.1 °F (36.7 °C) 99 %      MAP       --          Physical Exam  Nursing Notes and Vital Signs Reviewed.  Constitutional: Patient is in no acute distress. Awake and alert. Well-developed and well-nourished.  Head: Atraumatic. Normocephalic.  Eyes: PERRL. EOM intact. Conjunctivae are not pale. No scleral icterus.  ENT: Mucous membranes are moist. Oropharynx is clear and symmetric.    Neck: Supple. Full ROM. No lymphadenopathy.  Cardiovascular: Regular rate. Regular rhythm. No murmurs, rubs, or gallops. Distal pulses are 2+ and symmetric.  Pulmonary/Chest: No respiratory distress. Clear to auscultation bilaterally. No wheezing, rales, or rhonchi.  Abdominal: Soft and non-distended.  There is no tenderness.  No rebound, guarding, or rigidity.  Good bowel sounds.    Musculoskeletal: Moves all extremities. No obvious deformities. No edema. No calf tenderness.  Skin: Warm and dry.  Neurological: GCS 15. Alert, awake, and appropriate. Normal speech. Cranial nerves intact. Strength is normal. No acute focal neurological deficits are appreciated.  Psychiatric: Normal affect. Good eye contact. Appropriate in content.    ED Course    Procedures  ED Vital Signs:  Vitals:    09/27/18 0641 09/27/18 0815 09/27/18 1010   BP: (!) 147/91 136/84 (!) 148/81   Pulse: 97 89 85   Resp: 18 16 16   Temp: 98.1 °F (36.7 °C)     TempSrc: Oral     SpO2: 99% 96% 96%   Height: 5' 9" (1.753 m)         Abnormal Lab Results:  Labs Reviewed   CBC W/ AUTO DIFFERENTIAL - Abnormal; Notable for the following components:       Result Value    MCH 31.3 (*)     All other components within normal limits   URINALYSIS   DRUG SCREEN PANEL, URINE EMERGENCY   PREGNANCY TEST, URINE RAPID   COMPREHENSIVE METABOLIC " PANEL        All Lab Results:  Results for orders placed or performed during the hospital encounter of 09/27/18   CBC auto differential   Result Value Ref Range    WBC 10.74 3.90 - 12.70 K/uL    RBC 4.57 4.00 - 5.40 M/uL    Hemoglobin 14.3 12.0 - 16.0 g/dL    Hematocrit 41.1 37.0 - 48.5 %    MCV 90 82 - 98 fL    MCH 31.3 (H) 27.0 - 31.0 pg    MCHC 34.8 32.0 - 36.0 g/dL    RDW 12.9 11.5 - 14.5 %    Platelets 303 150 - 350 K/uL    MPV 9.7 9.2 - 12.9 fL    Gran # (ANC) 7.4 1.8 - 7.7 K/uL    Lymph # 2.4 1.0 - 4.8 K/uL    Mono # 0.8 0.3 - 1.0 K/uL    Eos # 0.1 0.0 - 0.5 K/uL    Baso # 0.04 0.00 - 0.20 K/uL    Gran% 68.9 38.0 - 73.0 %    Lymph% 22.7 18.0 - 48.0 %    Mono% 7.2 4.0 - 15.0 %    Eosinophil% 0.8 0.0 - 8.0 %    Basophil% 0.4 0.0 - 1.9 %    Differential Method Automated    Urinalysis   Result Value Ref Range    Specimen UA Urine, Clean Catch     Color, UA Yellow Yellow, Straw, Adrianne    Appearance, UA Clear Clear    pH, UA 8.0 5.0 - 8.0    Specific Gravity, UA 1.015 1.005 - 1.030    Protein, UA Negative Negative    Glucose, UA Negative Negative    Ketones, UA Negative Negative    Bilirubin (UA) Negative Negative    Occult Blood UA Negative Negative    Nitrite, UA Negative Negative    Urobilinogen, UA Negative <2.0 EU/dL    Leukocytes, UA Negative Negative   Drug screen panel, emergency   Result Value Ref Range    Benzodiazepines Negative     Methadone metabolites Negative     Cocaine (Metab.) Negative     Opiate Scrn, Ur Negative     Barbiturate Screen, Ur Negative     Amphetamine Screen, Ur Negative     THC Presumptive Positive     Phencyclidine Negative     Creatinine, Random Ur 35.3 15.0 - 325.0 mg/dL    Toxicology Information SEE COMMENT    Pregnancy, urine rapid (UPT)   Result Value Ref Range    Preg Test, Ur Negative    Comprehensive metabolic panel   Result Value Ref Range    Sodium 141 136 - 145 mmol/L    Potassium 4.6 3.5 - 5.1 mmol/L    Chloride 110 95 - 110 mmol/L    CO2 23 23 - 29 mmol/L    Glucose  107 70 - 110 mg/dL    BUN, Bld 15 6 - 20 mg/dL    Creatinine 0.8 0.5 - 1.4 mg/dL    Calcium 8.8 8.7 - 10.5 mg/dL    Total Protein 6.8 6.0 - 8.4 g/dL    Albumin 3.5 3.5 - 5.2 g/dL    Total Bilirubin 0.2 0.1 - 1.0 mg/dL    Alkaline Phosphatase 66 55 - 135 U/L    AST 27 10 - 40 U/L    ALT 42 10 - 44 U/L    Anion Gap 8 8 - 16 mmol/L    eGFR if African American >60 >60 mL/min/1.73 m^2    eGFR if non African American >60 >60 mL/min/1.73 m^2       The Emergency Provider reviewed the vital signs and test results, which are outlined above.    ED Discussion      10:33 AM: Reassessed pt at this time. She still has a HA. Will order Compazine. Discussed with pt all pertinent ED information and results. Discussed pt dx and plan of tx. Gave pt all f/u and return to the ED instructions. All questions and concerns were addressed at this time. Pt expresses understanding of information and instructions, and is comfortable with plan to discharge. Pt is stable for discharge.    Patient's headache is either consistent with previous headache and/or lacks features concerning for emergent or life threatening condition.  I do not suspect SAH, meningitis, increased IC pressure, infectious, toxic, vascular, CNS, or other EMC.  I have discussed this at length with patient and/or family/caretaker.    ED Medication(s):  Medications   sodium chloride 0.9% bolus 1,000 mL (0 mLs Intravenous Stopped 9/27/18 0926)   ketorolac injection 30 mg (30 mg Intravenous Given 9/27/18 0728)   ondansetron injection 4 mg (4 mg Intravenous Given 9/27/18 0727)   prochlorperazine injection Soln 10 mg (10 mg Intravenous Given 9/27/18 0811)          Medication List      ASK your doctor about these medications    acyclovir 200 MG capsule  Commonly known as:  ZOVIRAX  Take one capsule by mouth three times daily     albuterol-ipratropium 2.5 mg-0.5 mg/3 mL nebulizer solution  Commonly known as:  DUO-NEB  INHALE ONE VIAL VIA NEBULIZER EVERY SIX HOURS AS NEEDED FOR  WHEEZING.     * diclofenac sodium 1 % Gel  Commonly known as:  VOLTAREN  Apply 2 g topically 4 (four) times daily.     * diclofenac 50 MG EC tablet  Commonly known as:  VOLTAREN  Take 1 tablet (50 mg total) by mouth 2 (two) times daily. As needed     estradiol 2 MG tablet  Commonly known as:  ESTRACE  Take 1 tablet (2 mg total) by mouth once daily.     famotidine 40 MG tablet  Commonly known as:  PEPCID  Take 1 tablet (40 mg total) by mouth once daily.     gabapentin 600 MG tablet  Commonly known as:  NEURONTIN  take 1 tablet by mouth four times a day     hydroxychloroquine 200 mg tablet  Commonly known as:  PLAQUENIL  Take 1 tablet (200 mg total) by mouth once daily.     lidocaine-prilocaine cream  Commonly known as:  EMLA     pantoprazole 20 MG tablet  Commonly known as:  PROTONIX  Take 1 tablet (20 mg total) by mouth once daily.     tiZANidine 4 MG tablet  Commonly known as:  ZANAFLEX  Take 1-2 tablets (4-8 mg total) by mouth 3 (three) times daily as needed.     topiramate 100 MG tablet  Commonly known as:  TOPAMAX  Take 2 tablets (200 mg total) by mouth every 12 (twelve) hours as needed.     VENTOLIN HFA 90 mcg/actuation inhaler  Generic drug:  albuterol  inhale 2 puffs by mouth three times a day if needed         * This list has 2 medication(s) that are the same as other medications prescribed for you. Read the directions carefully, and ask your doctor or other care provider to review them with you.                     Medical Decision Making    Medical Decision Making:   Clinical Tests:   Lab Tests: Ordered and Reviewed           Scribe Attestation:   Scribe #1: I performed the above scribed service and the documentation accurately describes the services I performed. I attest to the accuracy of the note.    Attending:   Physician Attestation Statement for Scribe #1: I, Viky Yun MD, personally performed the services described in this documentation, as scribed by Marianela Aquino, in my presence, and it is  both accurate and complete.          Clinical Impression       ICD-10-CM ICD-9-CM   1. Chronic nonintractable headache, unspecified headache type R51 784.0       Disposition:   Disposition: Discharged  Condition: Stable         Viky Yun MD  09/28/18 [id number]

## 2018-11-09 ENCOUNTER — PATIENT MESSAGE (OUTPATIENT)
Dept: RHEUMATOLOGY | Facility: CLINIC | Age: 43
End: 2018-11-09

## 2018-11-09 DIAGNOSIS — I73.00 RAYNAUD'S PHENOMENON WITHOUT GANGRENE: ICD-10-CM

## 2018-11-09 RX ORDER — HYDROXYCHLOROQUINE SULFATE 200 MG/1
200 TABLET, FILM COATED ORAL DAILY
Qty: 30 TABLET | Refills: 3 | Status: SHIPPED | OUTPATIENT
Start: 2018-11-09 | End: 2018-12-05 | Stop reason: SDUPTHER

## 2018-11-09 RX ORDER — DICLOFENAC SODIUM 50 MG/1
50 TABLET, DELAYED RELEASE ORAL 2 TIMES DAILY
Qty: 60 TABLET | Refills: 3 | Status: SHIPPED | OUTPATIENT
Start: 2018-11-09 | End: 2018-12-05

## 2018-11-12 ENCOUNTER — TELEPHONE (OUTPATIENT)
Dept: RHEUMATOLOGY | Facility: CLINIC | Age: 43
End: 2018-11-12

## 2018-11-12 DIAGNOSIS — M79.7 FIBROMYALGIA: Primary | ICD-10-CM

## 2018-11-12 RX ORDER — CYCLOBENZAPRINE HCL 10 MG
10 TABLET ORAL NIGHTLY PRN
Qty: 30 TABLET | Refills: 3 | Status: SHIPPED | OUTPATIENT
Start: 2018-11-12 | End: 2019-02-15

## 2018-12-05 DIAGNOSIS — I73.00 RAYNAUD'S PHENOMENON WITHOUT GANGRENE: ICD-10-CM

## 2018-12-05 RX ORDER — DICLOFENAC SODIUM 50 MG/1
50 TABLET, DELAYED RELEASE ORAL 2 TIMES DAILY
Qty: 60 TABLET | Refills: 3 | Status: SHIPPED | OUTPATIENT
Start: 2018-12-05

## 2018-12-05 RX ORDER — HYDROXYCHLOROQUINE SULFATE 200 MG/1
200 TABLET, FILM COATED ORAL DAILY
Qty: 30 TABLET | Refills: 3 | Status: SHIPPED | OUTPATIENT
Start: 2018-12-05 | End: 2019-07-25 | Stop reason: SDUPTHER

## 2018-12-27 RX ORDER — BACLOFEN 10 MG/1
TABLET ORAL
Qty: 180 TABLET | Refills: 11 | OUTPATIENT
Start: 2018-12-27

## 2019-02-14 ENCOUNTER — PATIENT MESSAGE (OUTPATIENT)
Dept: PHYSICAL MEDICINE AND REHAB | Facility: CLINIC | Age: 44
End: 2019-02-14

## 2019-02-15 DIAGNOSIS — M79.7 FIBROMYALGIA: Primary | ICD-10-CM

## 2019-02-15 RX ORDER — TIZANIDINE 4 MG/1
4 TABLET ORAL 3 TIMES DAILY PRN
Qty: 90 TABLET | Refills: 5 | Status: SHIPPED | OUTPATIENT
Start: 2019-02-15 | End: 2019-03-11 | Stop reason: SDUPTHER

## 2019-02-16 DIAGNOSIS — M79.7 FIBROMYALGIA: ICD-10-CM

## 2019-02-18 RX ORDER — TIZANIDINE 4 MG/1
TABLET ORAL
Qty: 180 TABLET | Refills: 1 | OUTPATIENT
Start: 2019-02-18

## 2019-03-11 ENCOUNTER — TELEPHONE (OUTPATIENT)
Dept: PHYSICAL MEDICINE AND REHAB | Facility: CLINIC | Age: 44
End: 2019-03-11

## 2019-03-11 ENCOUNTER — OFFICE VISIT (OUTPATIENT)
Dept: PHYSICAL MEDICINE AND REHAB | Facility: CLINIC | Age: 44
End: 2019-03-11
Payer: MEDICAID

## 2019-03-11 VITALS
WEIGHT: 191.81 LBS | DIASTOLIC BLOOD PRESSURE: 91 MMHG | HEART RATE: 109 BPM | HEIGHT: 69 IN | BODY MASS INDEX: 28.41 KG/M2 | SYSTOLIC BLOOD PRESSURE: 135 MMHG

## 2019-03-11 DIAGNOSIS — M79.7 FIBROMYALGIA: ICD-10-CM

## 2019-03-11 PROCEDURE — 99214 OFFICE O/P EST MOD 30 MIN: CPT | Mod: S$PBB,,, | Performed by: PHYSICAL MEDICINE & REHABILITATION

## 2019-03-11 PROCEDURE — 99213 OFFICE O/P EST LOW 20 MIN: CPT | Mod: PBBFAC,PN | Performed by: PHYSICAL MEDICINE & REHABILITATION

## 2019-03-11 PROCEDURE — 99999 PR PBB SHADOW E&M-EST. PATIENT-LVL III: ICD-10-PCS | Mod: PBBFAC,,, | Performed by: PHYSICAL MEDICINE & REHABILITATION

## 2019-03-11 PROCEDURE — 99999 PR PBB SHADOW E&M-EST. PATIENT-LVL III: CPT | Mod: PBBFAC,,, | Performed by: PHYSICAL MEDICINE & REHABILITATION

## 2019-03-11 PROCEDURE — 99214 PR OFFICE/OUTPT VISIT, EST, LEVL IV, 30-39 MIN: ICD-10-PCS | Mod: S$PBB,,, | Performed by: PHYSICAL MEDICINE & REHABILITATION

## 2019-03-11 RX ORDER — KETOROLAC TROMETHAMINE 10 MG/1
10 TABLET, FILM COATED ORAL 2 TIMES DAILY
Qty: 8 TABLET | Refills: 0 | Status: SHIPPED | OUTPATIENT
Start: 2019-03-11 | End: 2019-03-15

## 2019-03-11 RX ORDER — TIZANIDINE 4 MG/1
4-8 TABLET ORAL 3 TIMES DAILY PRN
Qty: 270 TABLET | Refills: 5 | Status: SHIPPED | OUTPATIENT
Start: 2019-03-11 | End: 2019-12-23

## 2019-03-11 NOTE — TELEPHONE ENCOUNTER
----- Message from Ngozi Farris sent at 3/11/2019 10:18 AM CDT -----  Contact: Patient  Type:  Needs Medical Advice    Who Called: Mariah  Symptoms (please be specific): n/a  How long has patient had these symptoms:  n/a  Pharmacy name and phone #:      [id number] [place] - [address] [facility]  [address]  [place] [address]  Phone: [phone] Fax: [phone]    Would the patient rather a call back or a response via [facility]?  Call back   Best Call Back Number: [phone]  Additional Information: The patient just went to the pharmacy; they didn't get any prescriptions for her. She stated [facility].

## 2019-03-11 NOTE — TELEPHONE ENCOUNTER
Contacted pt. Informed pt rx was sent to   [id number] - [place] - [address] AT [facility] [phone] (Phone)     Pt was able to vebalize all understanding. All questions answered.//lp

## 2019-03-11 NOTE — PROGRESS NOTES
PM&R  CLINIC NOTE    Chief Complaint   Patient presents with    Back Pain    Hip Pain     HPI: This is a 43 y.o. year old female being seen in clinic today for follow up fibromyalgia. She is having increased achy pain and weight gain    History obtained from patient    ROS  General- denies lethargy, +weight gain, fever, chills  Head/neck- denies swallowing difficulties  ENT- denies hearing changes  Cardiovascular-denies chest pain  Pulmonary- denies shortness of breath  GI- denies constipation or bowel incontinence  - denies bladder incontinence  Skin- denies wounds or rashes  Musculoskeletal- +weakness, +pain  Neurologic- denies numbness and tingling  Psychiatric- denies depressive or psychotic features, denies anxiety  Lymphatic-denies swelling  Endocrine- denies hypoglycemic symptoms/DM history      Physical Examination:  General: Well developed, well nourished, NAD, flat affect  HEENT: NCAT EOMI  Pulmonary: Normal respirations    Spinal Examination: CERVICAL  Active ROM is within normal limits, but limited at endranges  Still ttp at fibro points*    Spinal Examination: LUMBAR or THORACIC  Active ROM is within normal limits, but limited at end ranges  Inspection: No deformity of spinal alignment  ttp at right gt bursa and piriformis, ttp at si joints    Bilateral upper and lower Extremities:  Shoulder/Elbow/Wrist/Hand ROM wnl  Hip, knee, ankle ROM wnl except limited at right hip due to discomfort  Bilateral Extremities show normal capillary refill.  No signs of cyanosis, rubor, skin changes, or dysvascular changes of appendages.  Nails appear intact.    No focal atrophy is noted of either upper or lower extremity.  Gait: antalgic, limited hip and knee rom on right    IMPRESSION/PLAN: This is a 43 y.o. year old female with fibromyalgia/myofascial pain, sacroiliitis    1. Pt still needs to focus on exercise routine.    2. Cont zanaflex, try ketorolac x5 days, hold off on diclofenac until finished   3. Ice/heat  as needed, si belt   4. cont Gabapentin 600mg QHS  6. Cont f.u with rheumatology, gi for issues    Mandy Ordoñez M.D.  Physical Medicine and Rehab

## 2019-04-09 DIAGNOSIS — M79.7 FIBROMYALGIA: ICD-10-CM

## 2019-04-09 RX ORDER — KETOROLAC TROMETHAMINE 10 MG/1
TABLET, FILM COATED ORAL
Qty: 8 TABLET | Refills: 0 | OUTPATIENT
Start: 2019-04-09

## 2019-04-22 ENCOUNTER — TELEPHONE (OUTPATIENT)
Dept: OBSTETRICS AND GYNECOLOGY | Facility: CLINIC | Age: 44
End: 2019-04-22

## 2019-04-22 NOTE — TELEPHONE ENCOUNTER
Refill request for estrace 2mg    Did she do her mammo (orderered since 9/2018) in order to refill ert

## 2019-04-22 NOTE — TELEPHONE ENCOUNTER
Attempted to call pt. Left voicemail for pt to give the clinic a call back. Pt needs mammo scheduled before she can get refills.

## 2019-04-24 ENCOUNTER — TELEPHONE (OUTPATIENT)
Dept: OBSTETRICS AND GYNECOLOGY | Facility: CLINIC | Age: 44
End: 2019-04-24

## 2019-06-02 DIAGNOSIS — M79.7 FIBROMYALGIA: ICD-10-CM

## 2019-06-03 RX ORDER — KETOROLAC TROMETHAMINE 10 MG/1
TABLET, FILM COATED ORAL
Qty: 8 TABLET | Refills: 0 | OUTPATIENT
Start: 2019-06-03

## 2019-06-04 DIAGNOSIS — M79.7 FIBROMYALGIA: ICD-10-CM

## 2019-06-05 RX ORDER — KETOROLAC TROMETHAMINE 10 MG/1
TABLET, FILM COATED ORAL
Qty: 8 TABLET | Refills: 0 | OUTPATIENT
Start: 2019-06-05

## 2019-07-09 DIAGNOSIS — I73.00 RAYNAUD'S PHENOMENON WITHOUT GANGRENE: ICD-10-CM

## 2019-07-10 ENCOUNTER — PATIENT MESSAGE (OUTPATIENT)
Dept: PHYSICAL MEDICINE AND REHAB | Facility: CLINIC | Age: 44
End: 2019-07-10

## 2019-07-10 ENCOUNTER — PATIENT MESSAGE (OUTPATIENT)
Dept: RHEUMATOLOGY | Facility: CLINIC | Age: 44
End: 2019-07-10

## 2019-07-10 DIAGNOSIS — M79.7 FIBROMYALGIA: ICD-10-CM

## 2019-07-10 RX ORDER — HYDROXYCHLOROQUINE SULFATE 200 MG/1
TABLET, FILM COATED ORAL
Qty: 30 TABLET | Refills: 3 | OUTPATIENT
Start: 2019-07-10

## 2019-07-10 RX ORDER — GABAPENTIN 600 MG/1
TABLET ORAL
Qty: 120 TABLET | Refills: 11 | Status: SHIPPED | OUTPATIENT
Start: 2019-07-10 | End: 2019-12-23 | Stop reason: SDUPTHER

## 2019-07-25 DIAGNOSIS — I73.00 RAYNAUD'S PHENOMENON WITHOUT GANGRENE: ICD-10-CM

## 2019-07-25 RX ORDER — HYDROXYCHLOROQUINE SULFATE 200 MG/1
TABLET, FILM COATED ORAL
Qty: 30 TABLET | Refills: 3 | Status: SHIPPED | OUTPATIENT
Start: 2019-07-25 | End: 2020-03-03 | Stop reason: SDUPTHER

## 2019-07-26 ENCOUNTER — PATIENT MESSAGE (OUTPATIENT)
Dept: RHEUMATOLOGY | Facility: CLINIC | Age: 44
End: 2019-07-26

## 2019-07-29 DIAGNOSIS — I73.00 RAYNAUD'S PHENOMENON WITHOUT GANGRENE: ICD-10-CM

## 2019-07-29 RX ORDER — DICLOFENAC SODIUM 50 MG/1
TABLET, DELAYED RELEASE ORAL
Qty: 60 TABLET | Refills: 3 | OUTPATIENT
Start: 2019-07-29

## 2019-08-02 DIAGNOSIS — I73.00 RAYNAUD'S PHENOMENON WITHOUT GANGRENE: ICD-10-CM

## 2019-08-06 RX ORDER — DICLOFENAC SODIUM 50 MG/1
TABLET, DELAYED RELEASE ORAL
Qty: 60 TABLET | Refills: 3 | OUTPATIENT
Start: 2019-08-06

## 2019-08-08 ENCOUNTER — PATIENT MESSAGE (OUTPATIENT)
Dept: RHEUMATOLOGY | Facility: CLINIC | Age: 44
End: 2019-08-08

## 2019-08-08 DIAGNOSIS — I73.00 RAYNAUD'S PHENOMENON WITHOUT GANGRENE: ICD-10-CM

## 2019-08-08 RX ORDER — DICLOFENAC SODIUM 50 MG/1
TABLET, DELAYED RELEASE ORAL
Qty: 60 TABLET | Refills: 3 | OUTPATIENT
Start: 2019-08-08

## 2019-08-19 ENCOUNTER — PATIENT MESSAGE (OUTPATIENT)
Dept: RHEUMATOLOGY | Facility: CLINIC | Age: 44
End: 2019-08-19

## 2019-08-27 DIAGNOSIS — Z78.0 MENOPAUSE: ICD-10-CM

## 2019-08-27 RX ORDER — ESTRADIOL 2 MG/1
2 TABLET ORAL DAILY
Qty: 30 TABLET | Refills: 1 | Status: SHIPPED | OUTPATIENT
Start: 2019-08-27 | End: 2019-10-29 | Stop reason: SDUPTHER

## 2019-09-03 RX ORDER — BACLOFEN 10 MG/1
TABLET ORAL
Qty: 180 TABLET | Refills: 8 | OUTPATIENT
Start: 2019-09-03

## 2019-09-26 RX ORDER — BACLOFEN 10 MG/1
TABLET ORAL
Qty: 180 TABLET | Refills: 8 | Status: SHIPPED | OUTPATIENT
Start: 2019-09-26 | End: 2020-06-29

## 2019-09-29 DIAGNOSIS — I73.00 RAYNAUD'S PHENOMENON WITHOUT GANGRENE: ICD-10-CM

## 2019-09-30 RX ORDER — DICLOFENAC SODIUM 50 MG/1
TABLET, DELAYED RELEASE ORAL
Qty: 60 TABLET | Refills: 3 | OUTPATIENT
Start: 2019-09-30

## 2019-10-23 DIAGNOSIS — Z78.0 MENOPAUSE: ICD-10-CM

## 2019-10-23 RX ORDER — ESTRADIOL 2 MG/1
TABLET ORAL
Qty: 30 TABLET | Refills: 1 | OUTPATIENT
Start: 2019-10-23

## 2019-10-23 NOTE — TELEPHONE ENCOUNTER
Left messages for the pt. to call back. slee,lpn    Estrace rx denied; overdue for ww exam since 9/2019

## 2019-10-29 ENCOUNTER — PATIENT MESSAGE (OUTPATIENT)
Dept: OBSTETRICS AND GYNECOLOGY | Facility: CLINIC | Age: 44
End: 2019-10-29

## 2019-10-29 DIAGNOSIS — Z78.0 MENOPAUSE: ICD-10-CM

## 2019-10-29 RX ORDER — ESTRADIOL 2 MG/1
2 TABLET ORAL DAILY
Qty: 30 TABLET | Refills: 0 | Status: SHIPPED | OUTPATIENT
Start: 2019-10-29 | End: 2020-10-28

## 2019-11-11 DIAGNOSIS — I73.00 RAYNAUD'S PHENOMENON WITHOUT GANGRENE: ICD-10-CM

## 2019-11-12 RX ORDER — HYDROXYCHLOROQUINE SULFATE 200 MG/1
TABLET, FILM COATED ORAL
Qty: 30 TABLET | Refills: 3 | OUTPATIENT
Start: 2019-11-12

## 2019-12-22 ENCOUNTER — PATIENT MESSAGE (OUTPATIENT)
Dept: PHYSICAL MEDICINE AND REHAB | Facility: CLINIC | Age: 44
End: 2019-12-22

## 2019-12-23 DIAGNOSIS — M79.7 FIBROMYALGIA: ICD-10-CM

## 2019-12-23 RX ORDER — GABAPENTIN 800 MG/1
TABLET ORAL
Qty: 120 TABLET | Refills: 11 | Status: SHIPPED | OUTPATIENT
Start: 2019-12-23 | End: 2021-01-04

## 2019-12-23 RX ORDER — TIZANIDINE 4 MG/1
TABLET ORAL
Qty: 180 TABLET | Refills: 11 | Status: SHIPPED | OUTPATIENT
Start: 2019-12-23 | End: 2021-01-04

## 2019-12-23 RX ORDER — TIZANIDINE 4 MG/1
TABLET ORAL
Qty: 180 TABLET | Refills: 8 | Status: SHIPPED | OUTPATIENT
Start: 2019-12-23 | End: 2019-12-23 | Stop reason: SDUPTHER

## 2019-12-23 RX ORDER — GABAPENTIN 800 MG/1
TABLET ORAL
Qty: 120 TABLET | Refills: 11 | Status: SHIPPED | OUTPATIENT
Start: 2019-12-23 | End: 2019-12-23 | Stop reason: SDUPTHER

## 2019-12-24 ENCOUNTER — PATIENT MESSAGE (OUTPATIENT)
Dept: PHYSICAL MEDICINE AND REHAB | Facility: CLINIC | Age: 44
End: 2019-12-24

## 2020-01-20 ENCOUNTER — TELEPHONE (OUTPATIENT)
Dept: PHYSICAL MEDICINE AND REHAB | Facility: CLINIC | Age: 45
End: 2020-01-20

## 2020-01-20 NOTE — TELEPHONE ENCOUNTER
----- Message from Eli Abreu sent at 1/20/2020  2:16 PM CST -----  Contact: pharmacy  .Type:  Pharmacy Calling to Clarify an RX    Name of Caller: pharmacy   [facility]  Prescription Name:  Baclofen   What do they need to clarify?: is pt suppose to take 2 muscle relaxer's   Best Call Back Number: [phone]   Additional Information:

## 2020-01-28 ENCOUNTER — TELEPHONE (OUTPATIENT)
Dept: PHYSICAL MEDICINE AND REHAB | Facility: CLINIC | Age: 45
End: 2020-01-28

## 2020-01-28 NOTE — TELEPHONE ENCOUNTER
----- Message from Maru Jesus sent at 1/28/2020  2:40 PM CST -----  Contact: patient   Type:  Sooner Apoointment Request    Caller is requesting a sooner appointment.  Caller declined first available appointment listed below.  Caller will not accept being placed on the waitlist and is requesting a message be sent to doctor.  Name of Caller:Mariah Boggs   When is the first available appointment?unsure   Symptoms:f-u  Would the patient rather a call back or a response via [facility]? call  Best Call Back Number:[phone]  Additional Information: n/a

## 2020-03-02 NOTE — PROGRESS NOTES
"Subjective:       Patient ID: Mariah Boggs is a 44 y.o. female.    Chief Complaint: Joint pain; Raynaud Disease; and Fibromyalgia    Mariah is here for follow up, hasn't been seen since June of 2018.      She has a history of fibromyalgia, +hla-b27, and raynauds.  She says she has lupus as well but this has not been documented. History of raynauds without ulcers.   She reports a history of a positive SATNAM at another facility.  Repeat anas done here have been negative. She was placed on plaquenil 200mg daily she says for her lupus.  She feels like the plaquenil has helped her overall, reports frequent "flares" when off her plaquenil. We have discussed lowering plaquenil in the past but she was firmly against this idea as she states she feels better on hcq.      She has chronic widespread pain throughout her body and c/o joint pain, no swelling.  Pain in her back, hips,  knees and neck.   Has taken lyrica and lexapro in the past but didn't do well on these.  She has a h/o depression and anxiety.      Regarding her +HLA-B27, she does complain of low back pain that has been consistent with mechanical back pain.  She has an xray of her SI joints in the past that did note sclerosis at the iliac portion of the si joints.  MRI was done and was normal. Repeat L spine and SI xrays done 11/2017 shows degenerative changes, no sclerosis.    Today she has multiple issues. She c/o uncontrollable shaking, joint pain, joint swelling, rashes, skin falling off, nails falling out,  teeth falling out,  Hair falling out.  She states she has seen her primary care physician whom she states is SUNIL in [place].  She is supposed to be seeing a neurologist very soon.        Review of Systems   Constitutional: Positive for fatigue. Negative for chills and fever.   HENT: Negative for mouth sores, rhinorrhea and sore throat.    Eyes: Negative for pain and redness.   Respiratory: Negative for cough, shortness of breath and wheezing.  " "  Cardiovascular: Negative for chest pain.   Gastrointestinal: Negative for abdominal pain, constipation, diarrhea, nausea and vomiting.   Genitourinary: Negative for dysuria and hematuria.        Reported h/o kidney stones   Musculoskeletal: Positive for arthralgias, back pain, joint swelling, myalgias and neck pain.   Skin: Positive for rash.        raynauds   Neurological: Negative for weakness, numbness and headaches.        H/o frequent dizzy spells   Hematological: Negative for adenopathy.   Psychiatric/Behavioral: Positive for agitation. The patient is nervous/anxious.         Flat affect         Objective:   /82   Pulse 88   Ht 5' 9" (1.753 m)   Wt 73.3 kg (161 lb 9.6 oz)   LMP  (LMP Unknown)   BMI 23.86 kg/m²      Physical Exam   Constitutional: She is oriented to person, place, and time. She appears distressed.   She is very agitated, shaking, jerking movements   HENT:   Head: Normocephalic and atraumatic.   Poor dental health    Eyes: Pupils are equal, round, and reactive to light. Right eye exhibits no discharge.   Neck: Normal range of motion.   Cardiovascular: Normal rate, regular rhythm and normal heart sounds.  Exam reveals no friction rub.    Pulmonary/Chest: Effort normal and breath sounds normal. No respiratory distress.   Cough with deep breaths   Abdominal: Soft. She exhibits no distension. There is no tenderness.   Lymphadenopathy:     She has no cervical adenopathy.   Neurological: She is alert and oriented to person, place, and time.   Skin: No rash noted. No erythema.     No digital ulcers  No rashes present but she thinks she has rash and flaking skin on her body, she tried to scrape her skin off with a card today but nothing came off.     Nails have been bitten down    No bald spots in her hair.   Psychiatric: Mood normal.   Musculoskeletal: Normal range of motion. She exhibits tenderness. She exhibits no edema or deformity.   No synovitis or joint effusions noted  Tender " myofascial points throughout shoulders and neck, elbows, hips and knees--very tender to light touch +jump sign     Her exam is limited due to her general states of anxiety and shaking, barely able to ambulate            No results found for this or any previous visit (from the past 168 hour(s)).  xr SI joints 2014:   Mild sclerosis of the iliac portion of the SI joints, left slightly greater than right.  XR Lspine 2014: normal  MRI pelvis 11/2015:   Marrow signal.the visualized osseous structures is within normal limits.  No abnormal marrow edema, evidence of fracture, or marrow replacement process.    Bilateral SI joints appear unremarkable with no appreciable erosive changes.    Visualized sacral nerve roots appear unremarkable.  Impression: no abnormal findings.         Assessment:       1. Fibromyalgia    2. Raynaud's phenomenon without gangrene    3. Multiple joint pain    4. Chronic bilateral low back pain without sciatica    5. Depression, unspecified depression type    6. HLA B27 (HLA B27 positive)    7. Medication monitoring encounter        Impression:    Multiple issues today:  Uncontrolled shaking/seizure like activity the entire office visit, large jerking movements, reports of rashes, reports of skin/nails/hair falling out, c/o joint pain and swelling; physical exam is unremarkable other than jerking and unusual behavior, will have to explore all possible causes    1. Fibromyalgia:  gabapentin 600mg QID, failed lyrica, lexapro 10mg/day for depression per pcp (stopped taking), continues with widespread pain; zanaflex 4 mg tid per dr. maldonado    2. 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 Chronic low back pain: worse with activity, c/o is more consistent with mechanical back pain, voltaren bid    4.  Multiple joint pain: taking voltaren bid, c/o back hip and knee pain    5. Patient reported history of lupus: history of +NUBIA outside facility, was started on plaquenil once daily unsure if this was for her back or NUBIA, review of notes suggest was started due to SI sclerosis and back pain, does not fit criteria for SLE--she is unwilling to come off her plaquenil     6. Medication monitoring: plaquenil once daily,need toxicity labs        Plan:       Will do lab work up today check cbc, cmp, sed rate, crp, nubia, dsdna, c3, c4, urine, and tox screen   Recommend she get into see neurology asap-she assures me she is doing this    Per her request will refer to dermatology to evaluate her skin complaints     I recommended if her NUBIA is negative that she come off plaquenil and she was very upset about this, will refill one month, I did explain that if she has no evidence of active CTD the plaquenil isn't helping and the SE risk isn't worth continuation of therapy     She was very anxious and agitated during the visit.  I will have her see Dr. PASCAL, the chief of our dept next visit to see if he can shed any more light into her situation.  Will see if any of her labs are abnormal and we will see her sooner, otherwise he can see her back in 3-4 weeks to review labs.     Medical decision making: high complexity. Multiple issues were addressed. Overall, the multiple problems listed are of moderate to high severity that may impact quality of life and activities of daily living. Medications, treatment plan, as well as options and alternatives reviewed and discussed with patient. There was counseling of the patient concerning these issues. Time spend with the patient was at least 45 min with half the time spent in face to face counseling, education, disease state, treatment options, coordination of care and researching records.  She had multiple complaints and multiple issues had to be addressed

## 2020-03-03 ENCOUNTER — OFFICE VISIT (OUTPATIENT)
Dept: RHEUMATOLOGY | Facility: CLINIC | Age: 45
End: 2020-03-03
Payer: MEDICAID

## 2020-03-03 ENCOUNTER — LAB VISIT (OUTPATIENT)
Dept: LAB | Facility: HOSPITAL | Age: 45
End: 2020-03-03
Payer: MEDICAID

## 2020-03-03 VITALS
BODY MASS INDEX: 23.94 KG/M2 | HEART RATE: 88 BPM | HEIGHT: 69 IN | WEIGHT: 161.63 LBS | SYSTOLIC BLOOD PRESSURE: 118 MMHG | DIASTOLIC BLOOD PRESSURE: 82 MMHG

## 2020-03-03 DIAGNOSIS — M25.50 MULTIPLE JOINT PAIN: ICD-10-CM

## 2020-03-03 DIAGNOSIS — M54.50 CHRONIC BILATERAL LOW BACK PAIN WITHOUT SCIATICA: ICD-10-CM

## 2020-03-03 DIAGNOSIS — Z15.89 HLA B27 (HLA B27 POSITIVE): ICD-10-CM

## 2020-03-03 DIAGNOSIS — Z51.81 MEDICATION MONITORING ENCOUNTER: ICD-10-CM

## 2020-03-03 DIAGNOSIS — I73.00 RAYNAUD'S PHENOMENON WITHOUT GANGRENE: ICD-10-CM

## 2020-03-03 DIAGNOSIS — M79.7 FIBROMYALGIA: ICD-10-CM

## 2020-03-03 DIAGNOSIS — R25.2 SPASTIC: ICD-10-CM

## 2020-03-03 DIAGNOSIS — F32.A DEPRESSION, UNSPECIFIED DEPRESSION TYPE: ICD-10-CM

## 2020-03-03 DIAGNOSIS — R21 RASH: ICD-10-CM

## 2020-03-03 DIAGNOSIS — G89.29 CHRONIC BILATERAL LOW BACK PAIN WITHOUT SCIATICA: ICD-10-CM

## 2020-03-03 DIAGNOSIS — M79.7 FIBROMYALGIA: Primary | ICD-10-CM

## 2020-03-03 LAB
ALBUMIN SERPL BCP-MCNC: 4.1 G/DL (ref 3.5–5.2)
ALP SERPL-CCNC: 78 U/L (ref 55–135)
ALT SERPL W/O P-5'-P-CCNC: 16 U/L (ref 10–44)
ANION GAP SERPL CALC-SCNC: 14 MMOL/L (ref 8–16)
AST SERPL-CCNC: 15 U/L (ref 10–40)
BASOPHILS # BLD AUTO: 0.07 K/UL (ref 0–0.2)
BASOPHILS NFR BLD: 0.7 % (ref 0–1.9)
BILIRUB SERPL-MCNC: 0.4 MG/DL (ref 0.1–1)
BUN SERPL-MCNC: 9 MG/DL (ref 6–20)
CALCIUM SERPL-MCNC: 9.5 MG/DL (ref 8.7–10.5)
CHLORIDE SERPL-SCNC: 108 MMOL/L (ref 95–110)
CO2 SERPL-SCNC: 17 MMOL/L (ref 23–29)
CREAT SERPL-MCNC: 1.5 MG/DL (ref 0.5–1.4)
CRP SERPL-MCNC: 10.2 MG/L (ref 0–8.2)
DIFFERENTIAL METHOD: ABNORMAL
EOSINOPHIL # BLD AUTO: 1.2 K/UL (ref 0–0.5)
EOSINOPHIL NFR BLD: 12.1 % (ref 0–8)
ERYTHROCYTE [DISTWIDTH] IN BLOOD BY AUTOMATED COUNT: 13.6 % (ref 11.5–14.5)
ERYTHROCYTE [SEDIMENTATION RATE] IN BLOOD BY WESTERGREN METHOD: 19 MM/HR (ref 0–36)
EST. GFR  (AFRICAN AMERICAN): 49 ML/MIN/1.73 M^2
EST. GFR  (NON AFRICAN AMERICAN): 43 ML/MIN/1.73 M^2
GLUCOSE SERPL-MCNC: 119 MG/DL (ref 70–110)
HCT VFR BLD AUTO: 35.9 % (ref 37–48.5)
HGB BLD-MCNC: 12.5 G/DL (ref 12–16)
IMM GRANULOCYTES # BLD AUTO: 0.03 K/UL (ref 0–0.04)
IMM GRANULOCYTES NFR BLD AUTO: 0.3 % (ref 0–0.5)
LYMPHOCYTES # BLD AUTO: 3.1 K/UL (ref 1–4.8)
LYMPHOCYTES NFR BLD: 31.4 % (ref 18–48)
MCH RBC QN AUTO: 29.4 PG (ref 27–31)
MCHC RBC AUTO-ENTMCNC: 34.8 G/DL (ref 32–36)
MCV RBC AUTO: 85 FL (ref 82–98)
MONOCYTES # BLD AUTO: 1.5 K/UL (ref 0.3–1)
MONOCYTES NFR BLD: 15.2 % (ref 4–15)
NEUTROPHILS # BLD AUTO: 4.1 K/UL (ref 1.8–7.7)
NEUTROPHILS NFR BLD: 40.6 % (ref 38–73)
NRBC BLD-RTO: 0 /100 WBC
PLATELET # BLD AUTO: 267 K/UL (ref 150–350)
PMV BLD AUTO: 10.2 FL (ref 9.2–12.9)
POTASSIUM SERPL-SCNC: 3.6 MMOL/L (ref 3.5–5.1)
PROT SERPL-MCNC: 7.4 G/DL (ref 6–8.4)
RBC # BLD AUTO: 4.25 M/UL (ref 4–5.4)
SODIUM SERPL-SCNC: 139 MMOL/L (ref 136–145)
WBC # BLD AUTO: 9.98 K/UL (ref 3.9–12.7)

## 2020-03-03 PROCEDURE — 99999 PR PBB SHADOW E&M-EST. PATIENT-LVL IV: ICD-10-PCS | Mod: PBBFAC,,, | Performed by: PHYSICIAN ASSISTANT

## 2020-03-03 PROCEDURE — 80307 DRUG TEST PRSMV CHEM ANLYZR: CPT

## 2020-03-03 PROCEDURE — 86160 COMPLEMENT ANTIGEN: CPT | Mod: 59

## 2020-03-03 PROCEDURE — 80053 COMPREHEN METABOLIC PANEL: CPT

## 2020-03-03 PROCEDURE — 99214 OFFICE O/P EST MOD 30 MIN: CPT | Mod: PBBFAC | Performed by: PHYSICIAN ASSISTANT

## 2020-03-03 PROCEDURE — 99999 PR PBB SHADOW E&M-EST. PATIENT-LVL IV: CPT | Mod: PBBFAC,,, | Performed by: PHYSICIAN ASSISTANT

## 2020-03-03 PROCEDURE — 36415 COLL VENOUS BLD VENIPUNCTURE: CPT

## 2020-03-03 PROCEDURE — 99215 OFFICE O/P EST HI 40 MIN: CPT | Mod: S$PBB,,, | Performed by: PHYSICIAN ASSISTANT

## 2020-03-03 PROCEDURE — 86140 C-REACTIVE PROTEIN: CPT

## 2020-03-03 PROCEDURE — 86225 DNA ANTIBODY NATIVE: CPT

## 2020-03-03 PROCEDURE — 99215 PR OFFICE/OUTPT VISIT, EST, LEVL V, 40-54 MIN: ICD-10-PCS | Mod: S$PBB,,, | Performed by: PHYSICIAN ASSISTANT

## 2020-03-03 PROCEDURE — 85025 COMPLETE CBC W/AUTO DIFF WBC: CPT

## 2020-03-03 PROCEDURE — 86038 ANTINUCLEAR ANTIBODIES: CPT

## 2020-03-03 PROCEDURE — 85652 RBC SED RATE AUTOMATED: CPT

## 2020-03-03 PROCEDURE — 86160 COMPLEMENT ANTIGEN: CPT

## 2020-03-03 RX ORDER — HYDROXYCHLOROQUINE SULFATE 200 MG/1
200 TABLET, FILM COATED ORAL DAILY
Qty: 30 TABLET | Refills: 0 | Status: SHIPPED | OUTPATIENT
Start: 2020-03-03

## 2020-03-03 RX ORDER — HYDROXYZINE HYDROCHLORIDE 50 MG/1
TABLET, FILM COATED ORAL
COMMUNITY
Start: 2020-02-04

## 2020-03-03 RX ORDER — EZETIMIBE 10 MG/1
TABLET ORAL
COMMUNITY
Start: 2020-02-19

## 2020-03-03 RX ORDER — ATORVASTATIN CALCIUM 80 MG/1
TABLET, FILM COATED ORAL
COMMUNITY
Start: 2020-02-19

## 2020-03-03 RX ORDER — IBUPROFEN 800 MG/1
TABLET ORAL
COMMUNITY
Start: 2020-02-19

## 2020-03-03 RX ORDER — ERGOCALCIFEROL 1.25 MG/1
CAPSULE ORAL
COMMUNITY
Start: 2020-02-13

## 2020-03-04 LAB
C3 SERPL-MCNC: 112 MG/DL (ref 50–180)
C4 SERPL-MCNC: 36 MG/DL (ref 11–44)

## 2020-03-05 LAB
ANA SER QL IF: NORMAL
DSDNA AB SER-ACNC: NORMAL [IU]/ML

## 2020-03-06 LAB
AMPHETAMINES SERPL QL: NEGATIVE
BARBITURATES SERPL QL SCN: NEGATIVE
BENZODIAZ SERPL QL SCN: NEGATIVE
BZE SERPL QL: NEGATIVE
CARBOXYTHC SERPL QL SCN: POSITIVE
ETHANOL SERPL QL SCN: NEGATIVE
METHADONE SERPL QL SCN: NEGATIVE
OPIATES SERPL QL SCN: NEGATIVE
PCP SERPL QL SCN: NEGATIVE
PROPOXYPH SERPL QL: NEGATIVE

## 2020-03-11 ENCOUNTER — PATIENT MESSAGE (OUTPATIENT)
Dept: PHYSICAL MEDICINE AND REHAB | Facility: CLINIC | Age: 45
End: 2020-03-11

## 2020-03-19 ENCOUNTER — PATIENT MESSAGE (OUTPATIENT)
Dept: PHYSICAL MEDICINE AND REHAB | Facility: CLINIC | Age: 45
End: 2020-03-19

## 2020-04-06 ENCOUNTER — TELEPHONE (OUTPATIENT)
Dept: RHEUMATOLOGY | Facility: CLINIC | Age: 45
End: 2020-04-06

## 2020-04-15 ENCOUNTER — TELEPHONE (OUTPATIENT)
Dept: RHEUMATOLOGY | Facility: CLINIC | Age: 45
End: 2020-04-15

## 2021-05-26 DIAGNOSIS — M79.7 FIBROMYALGIA: Primary | ICD-10-CM

## 2021-05-26 RX ORDER — BACLOFEN 10 MG/1
TABLET ORAL
Qty: 120 TABLET | Refills: 11 | Status: SHIPPED | OUTPATIENT
Start: 2021-05-26

## 2022-01-02 ENCOUNTER — HOSPITAL ENCOUNTER (EMERGENCY)
Facility: HOSPITAL | Age: 47
Discharge: PSYCHIATRIC OR SUBSTANCE USE FACILITY | End: 2022-01-03
Attending: EMERGENCY MEDICINE
Payer: MEDICAID

## 2022-01-02 DIAGNOSIS — Z00.8 MEDICAL CLEARANCE FOR PSYCHIATRIC ADMISSION: ICD-10-CM

## 2022-01-02 LAB
ALBUMIN SERPL BCP-MCNC: 3.8 G/DL (ref 3.5–5.2)
ALP SERPL-CCNC: 76 U/L (ref 55–135)
ALT SERPL W/O P-5'-P-CCNC: 12 U/L (ref 10–44)
ANION GAP SERPL CALC-SCNC: 11 MMOL/L (ref 8–16)
APAP SERPL-MCNC: <3 UG/ML (ref 10–20)
AST SERPL-CCNC: 12 U/L (ref 10–40)
BASOPHILS # BLD AUTO: 0.06 K/UL (ref 0–0.2)
BASOPHILS NFR BLD: 0.6 % (ref 0–1.9)
BILIRUB SERPL-MCNC: 0.2 MG/DL (ref 0.1–1)
BUN SERPL-MCNC: 10 MG/DL (ref 6–20)
CALCIUM SERPL-MCNC: 9 MG/DL (ref 8.7–10.5)
CHLORIDE SERPL-SCNC: 110 MMOL/L (ref 95–110)
CO2 SERPL-SCNC: 23 MMOL/L (ref 23–29)
CREAT SERPL-MCNC: 0.7 MG/DL (ref 0.5–1.4)
DIFFERENTIAL METHOD: ABNORMAL
EOSINOPHIL # BLD AUTO: 0 K/UL (ref 0–0.5)
EOSINOPHIL NFR BLD: 0.3 % (ref 0–8)
ERYTHROCYTE [DISTWIDTH] IN BLOOD BY AUTOMATED COUNT: 14.6 % (ref 11.5–14.5)
EST. GFR  (AFRICAN AMERICAN): >60 ML/MIN/1.73 M^2
EST. GFR  (NON AFRICAN AMERICAN): >60 ML/MIN/1.73 M^2
ETHANOL SERPL-MCNC: <10 MG/DL
GLUCOSE SERPL-MCNC: 98 MG/DL (ref 70–110)
HCT VFR BLD AUTO: 36.7 % (ref 37–48.5)
HGB BLD-MCNC: 11.2 G/DL (ref 12–16)
IMM GRANULOCYTES # BLD AUTO: 0.05 K/UL (ref 0–0.04)
IMM GRANULOCYTES NFR BLD AUTO: 0.5 % (ref 0–0.5)
LITHIUM SERPL-SCNC: <0.1 MMOL/L (ref 0.6–1.2)
LYMPHOCYTES # BLD AUTO: 1.7 K/UL (ref 1–4.8)
LYMPHOCYTES NFR BLD: 15.4 % (ref 18–48)
MCH RBC QN AUTO: 26.7 PG (ref 27–31)
MCHC RBC AUTO-ENTMCNC: 30.5 G/DL (ref 32–36)
MCV RBC AUTO: 88 FL (ref 82–98)
MONOCYTES # BLD AUTO: 0.8 K/UL (ref 0.3–1)
MONOCYTES NFR BLD: 7.5 % (ref 4–15)
NEUTROPHILS # BLD AUTO: 8.2 K/UL (ref 1.8–7.7)
NEUTROPHILS NFR BLD: 75.7 % (ref 38–73)
NRBC BLD-RTO: 0 /100 WBC
PLATELET # BLD AUTO: 274 K/UL (ref 150–450)
PMV BLD AUTO: 9.6 FL (ref 9.2–12.9)
POTASSIUM SERPL-SCNC: 2.9 MMOL/L (ref 3.5–5.1)
PROT SERPL-MCNC: 6.6 G/DL (ref 6–8.4)
RBC # BLD AUTO: 4.19 M/UL (ref 4–5.4)
SALICYLATES SERPL-MCNC: <5 MG/DL (ref 15–30)
SARS-COV-2 RDRP RESP QL NAA+PROBE: NEGATIVE
SODIUM SERPL-SCNC: 144 MMOL/L (ref 136–145)
TSH SERPL DL<=0.005 MIU/L-ACNC: 0.62 UIU/ML (ref 0.4–4)
WBC # BLD AUTO: 10.83 K/UL (ref 3.9–12.7)

## 2022-01-02 PROCEDURE — 96360 HYDRATION IV INFUSION INIT: CPT

## 2022-01-02 PROCEDURE — 93005 ELECTROCARDIOGRAM TRACING: CPT

## 2022-01-02 PROCEDURE — 80337 TRICYCLIC & CYCLICALS 6/MORE: CPT | Performed by: EMERGENCY MEDICINE

## 2022-01-02 PROCEDURE — 82077 ASSAY SPEC XCP UR&BREATH IA: CPT | Performed by: EMERGENCY MEDICINE

## 2022-01-02 PROCEDURE — 80179 DRUG ASSAY SALICYLATE: CPT | Performed by: EMERGENCY MEDICINE

## 2022-01-02 PROCEDURE — U0002 COVID-19 LAB TEST NON-CDC: HCPCS | Performed by: EMERGENCY MEDICINE

## 2022-01-02 PROCEDURE — 80053 COMPREHEN METABOLIC PANEL: CPT | Performed by: EMERGENCY MEDICINE

## 2022-01-02 PROCEDURE — 99285 EMERGENCY DEPT VISIT HI MDM: CPT | Mod: 25

## 2022-01-02 PROCEDURE — 93010 EKG 12-LEAD: ICD-10-PCS | Mod: ,,, | Performed by: INTERNAL MEDICINE

## 2022-01-02 PROCEDURE — 93010 ELECTROCARDIOGRAM REPORT: CPT | Mod: ,,, | Performed by: INTERNAL MEDICINE

## 2022-01-02 PROCEDURE — 80143 DRUG ASSAY ACETAMINOPHEN: CPT | Performed by: EMERGENCY MEDICINE

## 2022-01-02 PROCEDURE — 85025 COMPLETE CBC W/AUTO DIFF WBC: CPT | Performed by: EMERGENCY MEDICINE

## 2022-01-02 PROCEDURE — 84443 ASSAY THYROID STIM HORMONE: CPT | Performed by: EMERGENCY MEDICINE

## 2022-01-02 PROCEDURE — 80178 ASSAY OF LITHIUM: CPT | Performed by: EMERGENCY MEDICINE

## 2022-01-02 RX ADMIN — SODIUM CHLORIDE, SODIUM LACTATE, POTASSIUM CHLORIDE, AND CALCIUM CHLORIDE 1000 ML: .6; .31; .03; .02 INJECTION, SOLUTION INTRAVENOUS at 11:01

## 2022-01-03 VITALS
RESPIRATION RATE: 15 BRPM | TEMPERATURE: 99 F | BODY MASS INDEX: 23.37 KG/M2 | HEIGHT: 69 IN | WEIGHT: 157.81 LBS | OXYGEN SATURATION: 99 % | DIASTOLIC BLOOD PRESSURE: 73 MMHG | SYSTOLIC BLOOD PRESSURE: 105 MMHG | HEART RATE: 77 BPM

## 2022-01-03 LAB
B-HCG UR QL: NEGATIVE
BACTERIA #/AREA URNS HPF: NORMAL /HPF
BILIRUB UR QL STRIP: NEGATIVE
CLARITY UR: CLEAR
COLOR UR: YELLOW
GLUCOSE UR QL STRIP: NEGATIVE
HGB UR QL STRIP: NEGATIVE
HYALINE CASTS #/AREA URNS LPF: 1 /LPF
KETONES UR QL STRIP: NEGATIVE
LEUKOCYTE ESTERASE UR QL STRIP: NEGATIVE
MICROSCOPIC COMMENT: NORMAL
NITRITE UR QL STRIP: NEGATIVE
PH UR STRIP: 6 [PH] (ref 5–8)
PROT UR QL STRIP: ABNORMAL
RBC #/AREA URNS HPF: 0 /HPF (ref 0–4)
SP GR UR STRIP: >=1.03 (ref 1–1.03)
SQUAMOUS #/AREA URNS HPF: 3 /HPF
URN SPEC COLLECT METH UR: ABNORMAL
UROBILINOGEN UR STRIP-ACNC: NEGATIVE EU/DL
WBC #/AREA URNS HPF: 3 /HPF (ref 0–5)

## 2022-01-03 PROCEDURE — 63600175 PHARM REV CODE 636 W HCPCS: Performed by: EMERGENCY MEDICINE

## 2022-01-03 PROCEDURE — 81000 URINALYSIS NONAUTO W/SCOPE: CPT | Mod: 59 | Performed by: EMERGENCY MEDICINE

## 2022-01-03 PROCEDURE — 80307 DRUG TEST PRSMV CHEM ANLYZR: CPT | Performed by: EMERGENCY MEDICINE

## 2022-01-03 PROCEDURE — 25000003 PHARM REV CODE 250: Performed by: EMERGENCY MEDICINE

## 2022-01-03 PROCEDURE — 81025 URINE PREGNANCY TEST: CPT | Performed by: EMERGENCY MEDICINE

## 2022-01-03 RX ORDER — TRAZODONE HYDROCHLORIDE 50 MG/1
TABLET ORAL
COMMUNITY
Start: 2021-11-19

## 2022-01-03 RX ORDER — LEVETIRACETAM 1000 MG/1
1000 TABLET ORAL 2 TIMES DAILY
Qty: 60 TABLET | Refills: 11 | Status: SHIPPED | OUTPATIENT
Start: 2022-01-03 | End: 2023-01-03

## 2022-01-03 RX ORDER — CYANOCOBALAMIN 1000 UG/ML
1000 INJECTION, SOLUTION INTRAMUSCULAR; SUBCUTANEOUS
COMMUNITY

## 2022-01-03 RX ORDER — CLINDAMYCIN HYDROCHLORIDE 150 MG/1
450 CAPSULE ORAL EVERY 8 HOURS
Qty: 45 CAPSULE | Refills: 0 | Status: SHIPPED | OUTPATIENT
Start: 2022-01-03 | End: 2022-01-08

## 2022-01-03 RX ORDER — LEVETIRACETAM 500 MG/1
1 TABLET ORAL 2 TIMES DAILY
COMMUNITY
Start: 2021-12-27 | End: 2022-12-27

## 2022-01-03 RX ORDER — ONDANSETRON 4 MG/1
4 TABLET, ORALLY DISINTEGRATING ORAL 3 TIMES DAILY
COMMUNITY
Start: 2021-12-16

## 2022-01-03 RX ADMIN — POTASSIUM BICARBONATE 50 MEQ: 978 TABLET, EFFERVESCENT ORAL at 01:01

## 2022-01-03 NOTE — ED NOTES
T/c to patient's daughter, Dayanna, at [phone], with patient's permission.  Provided her with an update as to patient's status.

## 2022-01-03 NOTE — ED NOTES
Pt resting in bed, no acute distress noted. Bed in low and locked position. Room secured per [facility] protocol. Pt in blue scrubs and yellow socks. Pt being directly monitored by nazario Milligan at this time. Will continue to monitor.

## 2022-01-03 NOTE — ED PROVIDER NOTES
"Encounter Date: 1/2/2022    SCRIBE #1 NOTE: I, Ananth Esposito, am scribing for, and in the presence of,  Viky Yun MD. I have scribed the following portions of the note - Other sections scribed: ED discussion.       History     Chief Complaint   Patient presents with    Altered Mental Status     Pt. Presents to ED via [facility] due to possible overdose on an unknown substance. Per EMS pt received 2mg of narcan IN with no real response. Pt is responsive to voice      45 y/o F with PMH of polysubtance abuse here after intentional overdose. Patient took an unknown amount of unknown pills at a hotel room, and her son and law found her face down unresponsive. She had a note by her that read,    "Blyss, get in touch with my mom and she will have me creamated. Then spread ashes water manchac. I sorry to all. Love so much but this best I set you all free. "    The daughter says she noted some of her Gabapentin pills were missing, but is on several other medications. She is unsure if she took illicit drugs either.     With EMS, patient was given narcan with no response.     The history is provided by the EMS personnel and a relative. The history is limited by the condition of the patient.     Review of patient's allergies indicates:   Allergen Reactions    Metoclopramide Hives, Itching and Shortness Of Breath    Phenergan [promethazine] Other (See Comments)     Pt states she can not tolerated phenergen via IV ONLY (IV burns when she recd)-not a true allergy reaction-was not diluted enough order pushed too fast.    Cortisone Nausea And Vomiting     Past Medical History:   Diagnosis Date    Abnormal Pap smear of cervix     ADHD (attention deficit hyperactivity disorder)     Allergy     Anemia     Anxiety     Arthritis     Cervical cancer     hysterectomy     Chronic kidney disease     Depression     Wander Barr virus infection     at 11 years old    Fatigue     chronic fatigue syndrome    Heart murmur     " Hydronephrosis of right kidney     Lung disease     Lupus     Mixed restrictive and obstructive lung disease 2014    Nephrolithiasis     Pneumonia     PONV (postoperative nausea and vomiting)     PTSD (post-traumatic stress disorder)     Rash     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)    Tremor 2014     Past Surgical History:   Procedure Laterality Date    ADENOIDECTOMY      CERVICAL BIOPSY  W/ LOOP ELECTRODE EXCISION      CLAVICLE SURGERY Left     x2    COSMETIC SURGERY      HYSTERECTOMY      A2    SHOULDER SURGERY Left     TONSILLECTOMY       Family History   Problem Relation Age of Onset    Fibromyalgia Mother     Lupus Mother     Mental illness Mother     Asthma Mother     Hypertension Mother     Cancer Maternal Aunt     Asthma Father     Cancer Maternal Grandmother     Emphysema Maternal Grandmother     Diabetes Paternal Grandfather     Cancer Maternal Grandfather      Social History     Tobacco Use    Smoking status: Current Every Day Smoker     Packs/day: 0.50     Years: 25.00     Pack years: 12.50    Smokeless tobacco: Current User    Tobacco comment: trying to quit   Substance Use Topics    Alcohol use: No    Drug use: No     Review of Systems   Unable to perform ROS: Mental status change       Physical Exam     Initial Vitals [22 2104]   BP Pulse Resp Temp SpO2   (!) 146/87 88 16 97.6 °F (36.4 °C) 96 %      MAP       --         Physical Exam    Constitutional: She appears well-developed and well-nourished. She is not diaphoretic.   HENT:   Head: Normocephalic and atraumatic.   Mouth/Throat: No oropharyngeal exudate.   Dry mucous membranes   Eyes: EOM are normal. Pupils are equal, round, and reactive to light.   No nystagmus   Neck: Neck supple.   Normal range of motion.  Cardiovascular: Normal rate and regular rhythm.   Pulmonary/Chest: Breath sounds normal. No respiratory distress.   Abdominal: She exhibits no distension. There is no  "abdominal tenderness.   Musculoskeletal:         General: No tenderness. Normal range of motion.      Cervical back: Normal range of motion and neck supple.      Comments: No deformity     Neurological:   Sleepy. Does open eyes slightly to voice. I asked patient what she took, and she was able to slur "Im not telling you" to me. She is seen to move all 4 extremities, localizes to pain.    Skin: Skin is dry.   Cool         ED Course   Procedures  Labs Reviewed   CBC W/ AUTO DIFFERENTIAL - Abnormal; Notable for the following components:       Result Value    Hemoglobin 11.2 (*)     Hematocrit 36.7 (*)     MCH 26.7 (*)     MCHC 30.5 (*)     RDW 14.6 (*)     Gran # (ANC) 8.2 (*)     Immature Grans (Abs) 0.05 (*)     Gran % 75.7 (*)     Lymph % 15.4 (*)     All other components within normal limits   COMPREHENSIVE METABOLIC PANEL - Abnormal; Notable for the following components:    Potassium 2.9 (*)     All other components within normal limits   URINALYSIS, REFLEX TO URINE CULTURE - Abnormal; Notable for the following components:    Specific Gravity, UA >=1.030 (*)     Protein, UA 1+ (*)     All other components within normal limits    Narrative:     Specimen Source->Urine   ACETAMINOPHEN LEVEL - Abnormal; Notable for the following components:    Acetaminophen (Tylenol), Serum <3.0 (*)     All other components within normal limits   SALICYLATE LEVEL - Abnormal; Notable for the following components:    Salicylate Lvl <5.0 (*)     All other components within normal limits   LITHIUM LEVEL - Abnormal; Notable for the following components:    Lithium Level <0.1 (*)     All other components within normal limits   TSH   DRUG SCREEN PANEL, URINE EMERGENCY    Narrative:     Specimen Source->Urine   ALCOHOL,MEDICAL (ETHANOL)   SARS-COV-2 RNA AMPLIFICATION, QUAL   PREGNANCY TEST, URINE RAPID    Narrative:     Specimen Source->Urine   URINALYSIS MICROSCOPIC    Narrative:     Specimen Source->Urine   TRICYCLIC ANTIDEPRESSANT SCREEN " (REF LAB)     EKG Readings: (Independently Interpreted)   Rate of 77 beats per minute.  Normal sinus rhythm.  Normal axis.  P.r., QRS are within normal limits.  QTC is prolonged.  No STEMI.  Nonspecific ST and T-wave abnormalities.     ECG Results          EKG 12-lead (In process)  Result time 01/03/22 10:01:24    In process by Interface, Lab In [facility] (01/03/22 10:01:24)                 Narrative:    Test Reason : Z00.8,    Vent. Rate : 077 BPM     Atrial Rate : 077 BPM     P-R Int : 154 ms          QRS Dur : 096 ms      QT Int : 490 ms       P-R-T Axes : 050 060 003 degrees     QTc Int : 554 ms    Normal sinus rhythm  Nonspecific ST and T wave abnormality  Prolonged QT  Abnormal ECG  When compared with ECG of 15-MICH-2016 13:54,  Nonspecific T wave abnormality has replaced inverted T waves in Anterior  leads  QT has lengthened    Referred By: [email or web address]   SELF           Confirmed By:                             Imaging Results          CT Head Without Contrast (Final result)  Result time 01/02/22 22:44:10    Final result by Pete Cason MD (01/02/22 22:44:10)                 Impression:      No acute intracranial CT abnormality.    All CT scans at this facility are performed  using dose modulation techniques as appropriate to performed exam including the following:  automated exposure control; adjustment of mA and/or kV according to the patients size (this includes techniques or standardized protocols for targeted exams where dose is matched to indication/reason for exam: i.e. extremities or head);  iterative reconstruction technique.      Electronically signed by: Pete Cason  Date:    01/02/2022  Time:    22:44             Narrative:    EXAMINATION:  CT HEAD WITHOUT CONTRAST    CLINICAL HISTORY:  Mental status change, unknown cause;    TECHNIQUE:  Low dose axial CT images obtained throughout the head without intravenous contrast. Sagittal and coronal reconstructions were performed.    COMPARISON:  [facility].    FINDINGS:  Intracranial compartment:    Ventricles and sulci are normal in size for age without evidence of hydrocephalus. No extra-axial blood or fluid collections.    The brain parenchyma appears normal. No parenchymal mass, hemorrhage, edema or major vascular distribution infarct.    Skull/extracranial contents (limited evaluation): No fracture. Mastoid air cells and paranasal sinuses are essentially clear.                               X-Ray Chest 1 View (Final result)  Result time 01/02/22 22:13:04   Procedure changed from X-Ray Chest PA And Lateral     Final result by Pete Cason MD (01/02/22 22:13:04)                 Impression:      No acute abnormality.      Electronically signed by: Pete Cason  Date:    01/02/2022  Time:    22:13             Narrative:    EXAMINATION:  XR CHEST 1 VIEW    CLINICAL HISTORY:  SOB;    TECHNIQUE:  Single frontal view of the chest was performed.    COMPARISON:  [facility].    FINDINGS:  The lungs are clear, with normal appearance of pulmonary vasculature and no pleural effusion or pneumothorax.    The cardiac silhouette is normal in size. The hilar and mediastinal contours are unremarkable.    Bones are intact.                            ED discussion:   5:25 AM: Pt has been medically cleared by Dr. Yun at this time. Reassessed pt at this time. Pt is resting comfortably and appears in no acute distress. There are no psychiatric services offered at this facility. D/w pt all pertinent ED information and plan to transfer to psychiatric facility for psychiatric treatment. Pt verbalizes understanding. Patient being transferred by [facility] for ongoing personal protection en route. Pt has been made aware of all risks and benefits associated with transfer, including but not limited to death, MVC, loss of vital signs, and/or permanent disability. Benefits include ability to be treated at an inpatient psychiatric facility. Pt will be transported by personnel  trained in CPR and CPI. Patient understands that there could be unforeseen motor vehicle accidents, inclement weather, or loss of vital signs that could result in potential death or permanent disability. All questions and complaints have been addressed at this time. Pt condition is stable at this time and is clear to transfer to psychiatric facility at this time.        Medications   lactated ringers bolus 1,000 mL (0 mLs Intravenous Stopped 1/3/22 0058)   potassium bicarbonate disintegrating tablet 50 mEq (50 mEq Oral Given 1/3/22 0113)     Medical Decision Making:   Clinical Tests:   Lab Tests: Ordered and Reviewed  Radiological Study: Reviewed and Ordered  Medical Tests: Ordered and Reviewed          Scribe Attestation:   Scribe #1: I performed the above scribed service and the documentation accurately describes the services I performed. I attest to the accuracy of the note.    Attending Attestation:           Physician Attestation for Scribe:  Physician Attestation Statement for Scribe #1: I, Viky Yun MD, reviewed documentation, as scribed by Ananth Esposito in my presence, and it is both accurate and complete.             ED Course as of 01/03/22 1120   Sun Jan 02, 2022 2218 [facility] [BA]   2356 Patient awake, talking now [BA]      ED Course User Index  [BA] Mina Hall MD             Clinical Impression:   Final diagnoses:  [Z00.8] Medical clearance for psychiatric admission          ED Disposition Condition    Transfer to Psych Facility         ED Prescriptions     None        Follow-up Information    None          Gordon Amos MD  01/03/22 1120

## 2022-01-03 NOTE — ED NOTES
Pt resting in ER psych stretcher, awake and alert, rr e/u, NAD noted. Pt remains in blue scrubs per protocol. Bed low and locked. PSA at bs performing direct observation at this time. Pt denies further needs, will continue to monitor.

## 2022-01-03 NOTE — ED NOTES
T/c from placement center.  Pt has been accepted for placement, transportation will be requested through [facility].

## 2022-01-03 NOTE — ED NOTES
Pt resting in bed, no acute distress noted. Bed in low and locked position. Room secured per [facility] protocol. Pt's belongings previously sent home with family an in blue scrubs and yellow socks. Pt being directly monitored by nazario Milligan at this time. Will continue to monitor.

## 2022-01-03 NOTE — PLAN OF CARE
Spoke with pt at pt's request. Pt insists that she is not suicidal and that her daughter is setting her up to be PECed. Swer relayed this to attending MD who then went to speak with her.    Jerome Buck LMSW 1/3/2022 2:08 PM

## 2022-01-08 LAB
AMITRIP SERPL-MCNC: <10 NG/ML
AMITRIP+NOR SERPL-MCNC: ABNORMAL NG/ML (ref 95–250)
CLOMIPRAMINE SERPL-MCNC: <20 NG/ML
CLOMIPRAMINE+NOR SERPL-MCNC: ABNORMAL NG/ML (ref 220–500)
DESIPRAMINE SERPL-MCNC: <10 NG/ML (ref 100–300)
DOXEPIN SERPL-MCNC: <10 NG/ML
DOXEPIN+NOR SERPL-MCNC: ABNORMAL NG/ML (ref 100–300)
IMIPRAMINE SERPL-MCNC: <10 NG/ML
IMIPRAMINE+DESIPR SERPL-MCNC: ABNORMAL NG/ML (ref 150–300)
NORCLOMIPRAMINE SERPL-MCNC: <20 NG/ML
NORDOXEPIN SERPL-MCNC: <10 NG/ML
NORTRIP SERPL-MCNC: <10 NG/ML (ref 50–150)
PROTRIP SERPL-MCNC: <10 NG/ML (ref 70–240)

## 2024-11-19 NOTE — MR AVS SNAPSHOT
[facility]  [address] [facility]  [place] [address]  Phone: [phone]  Fax: [phone]                  Mariah Boggs   2017 8:00 AM   Office Visit    Description:  Female : 1975   Provider:  Andrzej Quick III, PA-C   Department:  [facility]           Reason for Visit     Dental Pain           Diagnoses this Visit        Comments    Tobacco dependence syndrome    -  Primary     Dental abscess         Moderate episode of recurrent major depressive disorder                To Do List           Future Appointments        Provider Department Dept Phone    3/28/2017 7:30 AM LABORATORY, [facility] [phone]    3/28/2017 8:00 AM Joi Ruiz PA-C [facility] Rheumatology [phone]      Goals (5 Years of Data)     None       These Medications        Disp Refills Start End    escitalopram oxalate (LEXAPRO) 10 MG tablet 30 tablet 5 2017    Take 1 tablet (10 mg total) by mouth once daily. - Oral    Pharmacy: [id number] Ph #: [phone]       clindamycin (CLEOCIN) 300 MG capsule 30 capsule 0 2017    Take 1 capsule (300 mg total) by mouth 3 (three) times daily. - Oral    Pharmacy: [id number] Ph #: [phone]       hydrocodone-acetaminophen 7.5-325mg (NORCO) 7.5-325 mg per tablet 20 tablet 0 2017     Take 1 tablet by mouth every 6 (six) hours as needed for Pain (dental abscess pain). - Oral    Pharmacy: [id number] Ph #: [phone]         [facility]     [facility] On [facility] Care Line -  Assistance  Registered nurses in the [facility] provide clinical advisement, health education, appointment booking, and other advisory services.  Call for this free service at [phone].             Medications            Message regarding Medications     Verify the changes and/or additions to your medication regime listed below are the same as discussed with your clinician today.  If any of these changes or additions are incorrect, please notify your healthcare provider.        START taking these NEW medications        Refills    escitalopram oxalate (LEXAPRO) 10 MG tablet 5    Sig: Take 1 tablet (10 mg total) by mouth once daily.    Class: Normal    Route: Oral    clindamycin (CLEOCIN) 300 MG capsule 0    Sig: Take 1 capsule (300 mg total) by mouth 3 (three) times daily.    Class: Normal    Route: Oral    hydrocodone-acetaminophen 7.5-325mg (NORCO) 7.5-325 mg per tablet 0    Sig: Take 1 tablet by mouth every 6 (six) hours as needed for Pain (dental abscess pain).    Class: Print    Route: Oral           Verify that the below list of medications is an accurate representation of the medications you are currently taking.  If none reported, the list may be blank. If incorrect, please contact your healthcare provider. Carry this list with you in case of emergency.           Current Medications     acyclovir (ZOVIRAX) 200 MG capsule Take one capsule by mouth three times daily    albuterol (VENTOLIN HFA) 90 mcg/actuation inhaler Inhale two puffs by mouth every four hours as needed  for wheezing    albuterol-ipratropium 2.5mg-0.5mg/3mL (DUO-NEB) 0.5 mg-3 mg(2.5 mg base)/3 mL nebulizer solution INHALE ONE VIAL VIA NEBULIZER EVERY SIX HOURS AS NEEDED FOR WHEEZING.     diclofenac-misoprostol  mg-mcg (ARTHROTEC 50)  mg-mcg per tablet Take 1 tablet by mouth 2 (two) times daily.    diphenhydrAMINE-aluminum-magnesium hydroxide-simethicone-lidocaine HCl 2% Swish and spit 5 mLs every 4 (four) hours as needed.    estradiol (ESTRACE) 2 MG tablet Take 1 tablet (2 mg total) by mouth once daily.    famotidine (PEPCID) 40 MG tablet     fluticasone (FLONASE ALLERGY RELIEF) 50 mcg/actuation nasal spray 2 sprays by Each Nare route once daily.  "   gabapentin (NEURONTIN) 600 MG tablet TAKE ONE TABLET BY MOUTH FOUR TIMES DAILY    hydroxychloroquine (PLAQUENIL) 200 mg tablet Take 1 tablet (200 mg total) by mouth once daily.    lidocaine-prilocaine (EMLA) cream Apply topically as needed.    LINZESS 290 mcg Cap TAKE ONE CAPSULE BY MOUTH ONE TIME DAILY    lubiprostone (AMITIZA) 8 MCG Cap Take 1 capsule (8 mcg total) by mouth 2 (two) times daily.    omeprazole (PRILOSEC) 20 MG capsule Take 1 capsule (20 mg total) by mouth once daily.    tizanidine (ZANAFLEX) 4 MG tablet Take 1-2 tablets (4-8 mg total) by mouth 3 (three) times daily as needed.    clindamycin (CLEOCIN) 300 MG capsule Take 1 capsule (300 mg total) by mouth 3 (three) times daily.    escitalopram oxalate (LEXAPRO) 10 MG tablet Take 1 tablet (10 mg total) by mouth once daily.    hydrocodone-acetaminophen 7.5-325mg (NORCO) 7.5-325 mg per tablet Take 1 tablet by mouth every 6 (six) hours as needed for Pain (dental abscess pain).           Clinical Reference Information           Your Vitals Were     BP Pulse Temp Height    120/82 (BP Location: Right arm, Patient Position: Sitting, BP Method: Manual) 80 97.4 °F (36.3 °C) (Tympanic) 5' 9" (1.753 m)    Weight Last Period SpO2 BMI    74.1 kg (163 lb 5.8 oz) (LMP Unknown) 98% 24.12 kg/m2      Blood Pressure          Most Recent Value    BP  120/82      Allergies as of 2/14/2017     Metoclopramide    Phenergan [Promethazine]    Cortisone    Penicillins      Immunizations Administered on Date of Encounter - 2/14/2017     None      Orders Placed During Today's Visit      Normal Orders This Visit    Ambulatory referral to Smoking Cessation Program       Instructions      Continue with antibiotics and new medicines until gone. May take tylenol PRN fever. Increase fluids and rest. Follow up with your dentist in 2 weeks. Suggest togo to the Emergency Room if symptoms get much worse. Otherwise follow up with your PCP as scheduled.        Smoking Cessation     If you " would like to quit smoking:   You may be eligible for free services if you are a [place] resident and started smoking cigarettes before September 1, 1988.  Call the [employer] ([employer]) toll free at (792) 836-1691 or ([phone]) 334-4360.   Call 1-800-QUIT-NOW if you do not meet the above criteria.            Language Assistance Services     ATTENTION: Language assistance services are available, free of charge. Please call [phone].      ATENCIÓN: Si habla español, tiene a valladares disposición servicios gratuitos de asistencia lingüística. Llame al [phone].     CHÚ Ý: N?u b?n nói Ti?ng Vi?t, có các d?ch v? h? tr? ngôn ng? mi?n phí dành cho b?n. G?i s? [phone].         [facility] complies with applicable Federal civil rights laws and does not discriminate on the basis of race, color, national origin, age, disability, or sex.         no

## (undated) DEVICE — COVER OVERHEAD SURG LT BLUE

## (undated) DEVICE — SEE MEDLINE ITEM 157027

## (undated) DEVICE — CONTAINER SPECIMEN STRL 4OZ

## (undated) DEVICE — SEE L#152161

## (undated) DEVICE — UNDERGLOVES BIOGEL PI SZ 7 LF

## (undated) DEVICE — INSERT CUSHIONPRONE VIEW LARGE

## (undated) DEVICE — GAUZE PETROLATUM VASLNE 3X36IN

## (undated) DEVICE — PANTIES FEMININE NAPKIN LG/XLG

## (undated) DEVICE — SEE MEDLINE ITEM 152622

## (undated) DEVICE — SOL NS 1000CC

## (undated) DEVICE — MANIFOLD 4 PORT

## (undated) DEVICE — DECANTER VIAL ASEPTIC TRANSFER

## (undated) DEVICE — SUT CHROMIC 3-0 SH 27IN GUT

## (undated) DEVICE — SYR 30CC LUER LOCK

## (undated) DEVICE — SHEARS HARMONIC CRVD 9 CM

## (undated) DEVICE — SEE MEDLINE ITEM 157117

## (undated) DEVICE — NDL HYPO SAFETY 22 X 1 1/2

## (undated) DEVICE — ELECTRODE REM PLYHSV RETURN 9

## (undated) DEVICE — GLOVE SURGICAL LATEX SZ 7

## (undated) DEVICE — GAUZE SPONGE 4X4 12PLY

## (undated) DEVICE — ADHESIVE MASTISOL VIAL 48/BX

## (undated) DEVICE — TAPE SILK 3IN

## (undated) DEVICE — SEE MEDLINE ITEM 157148

## (undated) DEVICE — BRIEF MESH LARGE